# Patient Record
Sex: FEMALE | Race: WHITE | NOT HISPANIC OR LATINO | Employment: UNEMPLOYED | URBAN - NONMETROPOLITAN AREA
[De-identification: names, ages, dates, MRNs, and addresses within clinical notes are randomized per-mention and may not be internally consistent; named-entity substitution may affect disease eponyms.]

---

## 2023-03-03 ENCOUNTER — APPOINTMENT (OUTPATIENT)
Dept: GENERAL RADIOLOGY | Facility: HOSPITAL | Age: 68
DRG: 494 | End: 2023-03-03
Payer: MEDICAID

## 2023-03-03 ENCOUNTER — HOSPITAL ENCOUNTER (INPATIENT)
Facility: HOSPITAL | Age: 68
LOS: 3 days | Discharge: HOME | DRG: 494 | End: 2023-03-06
Attending: ORTHOPAEDIC SURGERY | Admitting: STUDENT IN AN ORGANIZED HEALTH CARE EDUCATION/TRAINING PROGRAM
Payer: MEDICAID

## 2023-03-03 ENCOUNTER — ANESTHESIA EVENT (OUTPATIENT)
Dept: PERIOP | Facility: HOSPITAL | Age: 68
DRG: 494 | End: 2023-03-03
Payer: MEDICAID

## 2023-03-03 ENCOUNTER — ANESTHESIA (OUTPATIENT)
Dept: PERIOP | Facility: HOSPITAL | Age: 68
DRG: 494 | End: 2023-03-03
Payer: MEDICAID

## 2023-03-03 DIAGNOSIS — S82.302A CLOSED FRACTURE OF DISTAL END OF LEFT TIBIA, UNSPECIFIED FRACTURE MORPHOLOGY, INITIAL ENCOUNTER: Primary | ICD-10-CM

## 2023-03-03 DIAGNOSIS — S82.839A CLOSED FRACTURE OF DISTAL END OF FIBULA, UNSPECIFIED FRACTURE MORPHOLOGY, INITIAL ENCOUNTER: ICD-10-CM

## 2023-03-03 DIAGNOSIS — Z74.09 IMPAIRED MOBILITY: ICD-10-CM

## 2023-03-03 LAB
ALBUMIN SERPL-MCNC: 4.4 G/DL (ref 3.5–5.2)
ALBUMIN/GLOB SERPL: 1.8 G/DL
ALP SERPL-CCNC: 57 U/L (ref 39–117)
ALT SERPL W P-5'-P-CCNC: 14 U/L (ref 1–33)
ANION GAP SERPL CALCULATED.3IONS-SCNC: 12 MMOL/L (ref 5–15)
ANION GAP SERPL CALCULATED.3IONS-SCNC: 14 MMOL/L (ref 5–15)
APTT PPP: 27.1 SECONDS (ref 24.1–35)
AST SERPL-CCNC: 23 U/L (ref 1–32)
BACTERIA UR QL AUTO: ABNORMAL /HPF
BASOPHILS # BLD AUTO: 0.04 10*3/MM3 (ref 0–0.2)
BASOPHILS NFR BLD AUTO: 0.2 % (ref 0–1.5)
BILIRUB SERPL-MCNC: 0.3 MG/DL (ref 0–1.2)
BILIRUB UR QL STRIP: NEGATIVE
BUN SERPL-MCNC: 11 MG/DL (ref 8–23)
BUN SERPL-MCNC: 9 MG/DL (ref 8–23)
BUN/CREAT SERPL: 11.4 (ref 7–25)
BUN/CREAT SERPL: 14.1 (ref 7–25)
CALCIUM SPEC-SCNC: 9.1 MG/DL (ref 8.6–10.5)
CALCIUM SPEC-SCNC: 9.2 MG/DL (ref 8.6–10.5)
CHLORIDE SERPL-SCNC: 96 MMOL/L (ref 98–107)
CHLORIDE SERPL-SCNC: 97 MMOL/L (ref 98–107)
CLARITY UR: CLEAR
CO2 SERPL-SCNC: 23 MMOL/L (ref 22–29)
CO2 SERPL-SCNC: 25 MMOL/L (ref 22–29)
COLOR UR: YELLOW
CREAT SERPL-MCNC: 0.78 MG/DL (ref 0.57–1)
CREAT SERPL-MCNC: 0.79 MG/DL (ref 0.57–1)
DEPRECATED RDW RBC AUTO: 47.1 FL (ref 37–54)
EGFRCR SERPLBLD CKD-EPI 2021: 83.1 ML/MIN/1.73
EGFRCR SERPLBLD CKD-EPI 2021: 84.4 ML/MIN/1.73
EOSINOPHIL # BLD AUTO: 0.03 10*3/MM3 (ref 0–0.4)
EOSINOPHIL NFR BLD AUTO: 0.2 % (ref 0.3–6.2)
ERYTHROCYTE [DISTWIDTH] IN BLOOD BY AUTOMATED COUNT: 13.4 % (ref 12.3–15.4)
GLOBULIN UR ELPH-MCNC: 2.4 GM/DL
GLUCOSE SERPL-MCNC: 104 MG/DL (ref 65–99)
GLUCOSE SERPL-MCNC: 149 MG/DL (ref 65–99)
GLUCOSE UR STRIP-MCNC: NEGATIVE MG/DL
HCT VFR BLD AUTO: 40.4 % (ref 34–46.6)
HCT VFR BLD AUTO: 41.3 % (ref 34–46.6)
HGB BLD-MCNC: 13.5 G/DL (ref 12–15.9)
HGB BLD-MCNC: 13.6 G/DL (ref 12–15.9)
HGB UR QL STRIP.AUTO: ABNORMAL
HYALINE CASTS UR QL AUTO: ABNORMAL /LPF
IMM GRANULOCYTES # BLD AUTO: 0.12 10*3/MM3 (ref 0–0.05)
IMM GRANULOCYTES NFR BLD AUTO: 0.7 % (ref 0–0.5)
INR PPP: 1 (ref 0.91–1.09)
KETONES UR QL STRIP: NEGATIVE
LEUKOCYTE ESTERASE UR QL STRIP.AUTO: NEGATIVE
LYMPHOCYTES # BLD AUTO: 1.29 10*3/MM3 (ref 0.7–3.1)
LYMPHOCYTES NFR BLD AUTO: 7 % (ref 19.6–45.3)
MCH RBC QN AUTO: 31.2 PG (ref 26.6–33)
MCHC RBC AUTO-ENTMCNC: 32.9 G/DL (ref 31.5–35.7)
MCV RBC AUTO: 94.7 FL (ref 79–97)
MONOCYTES # BLD AUTO: 0.78 10*3/MM3 (ref 0.1–0.9)
MONOCYTES NFR BLD AUTO: 4.2 % (ref 5–12)
NEUTROPHILS NFR BLD AUTO: 16.19 10*3/MM3 (ref 1.7–7)
NEUTROPHILS NFR BLD AUTO: 87.7 % (ref 42.7–76)
NITRITE UR QL STRIP: NEGATIVE
NRBC BLD AUTO-RTO: 0 /100 WBC (ref 0–0.2)
PH UR STRIP.AUTO: 7 [PH] (ref 5–8)
PLATELET # BLD AUTO: 240 10*3/MM3 (ref 140–450)
PMV BLD AUTO: 9.3 FL (ref 6–12)
POTASSIUM SERPL-SCNC: 4.3 MMOL/L (ref 3.5–5.2)
POTASSIUM SERPL-SCNC: 4.3 MMOL/L (ref 3.5–5.2)
PROT SERPL-MCNC: 6.8 G/DL (ref 6–8.5)
PROT UR QL STRIP: NEGATIVE
PROTHROMBIN TIME: 13.3 SECONDS (ref 11.8–14.8)
RBC # BLD AUTO: 4.36 10*6/MM3 (ref 3.77–5.28)
RBC # UR STRIP: ABNORMAL /HPF
REF LAB TEST METHOD: ABNORMAL
SODIUM SERPL-SCNC: 133 MMOL/L (ref 136–145)
SODIUM SERPL-SCNC: 134 MMOL/L (ref 136–145)
SP GR UR STRIP: 1.01 (ref 1–1.03)
SQUAMOUS #/AREA URNS HPF: ABNORMAL /HPF
UROBILINOGEN UR QL STRIP: ABNORMAL
WBC # UR STRIP: ABNORMAL /HPF
WBC NRBC COR # BLD: 18.45 10*3/MM3 (ref 3.4–10.8)

## 2023-03-03 PROCEDURE — 25010000002 CEFAZOLIN PER 500 MG: Performed by: NURSE ANESTHETIST, CERTIFIED REGISTERED

## 2023-03-03 PROCEDURE — 25010000002 FENTANYL CITRATE (PF) 100 MCG/2ML SOLUTION: Performed by: NURSE ANESTHETIST, CERTIFIED REGISTERED

## 2023-03-03 PROCEDURE — 85018 HEMOGLOBIN: CPT | Performed by: ORTHOPAEDIC SURGERY

## 2023-03-03 PROCEDURE — 73630 X-RAY EXAM OF FOOT: CPT

## 2023-03-03 PROCEDURE — 85730 THROMBOPLASTIN TIME PARTIAL: CPT | Performed by: PHYSICIAN ASSISTANT

## 2023-03-03 PROCEDURE — 85025 COMPLETE CBC W/AUTO DIFF WBC: CPT | Performed by: PHYSICIAN ASSISTANT

## 2023-03-03 PROCEDURE — 25010000002 FENTANYL CITRATE (PF) 50 MCG/ML SOLUTION: Performed by: FAMILY MEDICINE

## 2023-03-03 PROCEDURE — 0 HYDROMORPHONE 1 MG/ML SOLUTION: Performed by: FAMILY MEDICINE

## 2023-03-03 PROCEDURE — 25010000002 PROPOFOL 10 MG/ML EMULSION: Performed by: NURSE ANESTHETIST, CERTIFIED REGISTERED

## 2023-03-03 PROCEDURE — 81001 URINALYSIS AUTO W/SCOPE: CPT | Performed by: STUDENT IN AN ORGANIZED HEALTH CARE EDUCATION/TRAINING PROGRAM

## 2023-03-03 PROCEDURE — 85014 HEMATOCRIT: CPT | Performed by: ORTHOPAEDIC SURGERY

## 2023-03-03 PROCEDURE — 25010000002 ONDANSETRON PER 1 MG: Performed by: NURSE ANESTHETIST, CERTIFIED REGISTERED

## 2023-03-03 PROCEDURE — 73620 X-RAY EXAM OF FOOT: CPT

## 2023-03-03 PROCEDURE — 73610 X-RAY EXAM OF ANKLE: CPT

## 2023-03-03 PROCEDURE — 25010000002 HYDROMORPHONE PER 4 MG: Performed by: EMERGENCY MEDICINE

## 2023-03-03 PROCEDURE — C1713 ANCHOR/SCREW BN/BN,TIS/BN: HCPCS | Performed by: ORTHOPAEDIC SURGERY

## 2023-03-03 PROCEDURE — 76000 FLUOROSCOPY <1 HR PHYS/QHP: CPT

## 2023-03-03 PROCEDURE — 73562 X-RAY EXAM OF KNEE 3: CPT

## 2023-03-03 PROCEDURE — 85610 PROTHROMBIN TIME: CPT | Performed by: PHYSICIAN ASSISTANT

## 2023-03-03 PROCEDURE — 99284 EMERGENCY DEPT VISIT MOD MDM: CPT

## 2023-03-03 PROCEDURE — 0QSH35Z REPOSITION LEFT TIBIA WITH EXTERNAL FIXATION DEVICE, PERCUTANEOUS APPROACH: ICD-10-PCS | Performed by: ORTHOPAEDIC SURGERY

## 2023-03-03 PROCEDURE — 73560 X-RAY EXAM OF KNEE 1 OR 2: CPT

## 2023-03-03 PROCEDURE — 80053 COMPREHEN METABOLIC PANEL: CPT | Performed by: PHYSICIAN ASSISTANT

## 2023-03-03 PROCEDURE — 25010000002 HYDROMORPHONE PER 4 MG: Performed by: ANESTHESIOLOGY

## 2023-03-03 PROCEDURE — 73590 X-RAY EXAM OF LOWER LEG: CPT

## 2023-03-03 PROCEDURE — 71045 X-RAY EXAM CHEST 1 VIEW: CPT

## 2023-03-03 DEVICE — PIN TRNSFX 6.0X225MM: Type: IMPLANTABLE DEVICE | Site: ANKLE | Status: FUNCTIONAL

## 2023-03-03 DEVICE — SCRW SCHNZ SD PT 5X200MM: Type: IMPLANTABLE DEVICE | Site: ANKLE | Status: FUNCTIONAL

## 2023-03-03 DEVICE — IMPLANTABLE DEVICE: Type: IMPLANTABLE DEVICE | Site: ANKLE | Status: FUNCTIONAL

## 2023-03-03 RX ORDER — TRAZODONE HYDROCHLORIDE 50 MG/1
25 TABLET ORAL NIGHTLY PRN
Status: DISCONTINUED | OUTPATIENT
Start: 2023-03-03 | End: 2023-03-06 | Stop reason: HOSPADM

## 2023-03-03 RX ORDER — FENTANYL CITRATE 50 UG/ML
25 INJECTION, SOLUTION INTRAMUSCULAR; INTRAVENOUS ONCE
Status: COMPLETED | OUTPATIENT
Start: 2023-03-03 | End: 2023-03-03

## 2023-03-03 RX ORDER — OXYCODONE HYDROCHLORIDE AND ACETAMINOPHEN 5; 325 MG/1; MG/1
1 TABLET ORAL EVERY 4 HOURS PRN
Status: DISCONTINUED | OUTPATIENT
Start: 2023-03-03 | End: 2023-03-06 | Stop reason: HOSPADM

## 2023-03-03 RX ORDER — ACETAMINOPHEN 325 MG/1
650 TABLET ORAL EVERY 4 HOURS PRN
Status: DISCONTINUED | OUTPATIENT
Start: 2023-03-03 | End: 2023-03-06 | Stop reason: HOSPADM

## 2023-03-03 RX ORDER — SODIUM CHLORIDE 0.9 % (FLUSH) 0.9 %
3-10 SYRINGE (ML) INJECTION AS NEEDED
Status: DISCONTINUED | OUTPATIENT
Start: 2023-03-03 | End: 2023-03-06 | Stop reason: HOSPADM

## 2023-03-03 RX ORDER — ASPIRIN 325 MG
325 TABLET ORAL DAILY
Status: DISCONTINUED | OUTPATIENT
Start: 2023-03-04 | End: 2023-03-06 | Stop reason: HOSPADM

## 2023-03-03 RX ORDER — SODIUM CHLORIDE, SODIUM LACTATE, POTASSIUM CHLORIDE, CALCIUM CHLORIDE 600; 310; 30; 20 MG/100ML; MG/100ML; MG/100ML; MG/100ML
100 INJECTION, SOLUTION INTRAVENOUS CONTINUOUS
Status: DISCONTINUED | OUTPATIENT
Start: 2023-03-03 | End: 2023-03-06 | Stop reason: HOSPADM

## 2023-03-03 RX ORDER — PROPOFOL 10 MG/ML
VIAL (ML) INTRAVENOUS AS NEEDED
Status: DISCONTINUED | OUTPATIENT
Start: 2023-03-03 | End: 2023-03-03 | Stop reason: SURG

## 2023-03-03 RX ORDER — SUCCINYLCHOLINE/SOD CL,ISO/PF 200MG/10ML
SYRINGE (ML) INTRAVENOUS AS NEEDED
Status: DISCONTINUED | OUTPATIENT
Start: 2023-03-03 | End: 2023-03-03 | Stop reason: SURG

## 2023-03-03 RX ORDER — FLUMAZENIL 0.1 MG/ML
0.2 INJECTION INTRAVENOUS AS NEEDED
Status: DISCONTINUED | OUTPATIENT
Start: 2023-03-03 | End: 2023-03-03 | Stop reason: HOSPADM

## 2023-03-03 RX ORDER — OXYCODONE HYDROCHLORIDE 5 MG/1
5 TABLET ORAL EVERY 4 HOURS PRN
Status: DISCONTINUED | OUTPATIENT
Start: 2023-03-03 | End: 2023-03-06 | Stop reason: HOSPADM

## 2023-03-03 RX ORDER — FAMOTIDINE 20 MG/1
20 TABLET, FILM COATED ORAL 2 TIMES DAILY PRN
Status: DISCONTINUED | OUTPATIENT
Start: 2023-03-03 | End: 2023-03-06 | Stop reason: HOSPADM

## 2023-03-03 RX ORDER — CEFAZOLIN SODIUM 1 G/3ML
INJECTION, POWDER, FOR SOLUTION INTRAMUSCULAR; INTRAVENOUS AS NEEDED
Status: DISCONTINUED | OUTPATIENT
Start: 2023-03-03 | End: 2023-03-03 | Stop reason: SURG

## 2023-03-03 RX ORDER — LIDOCAINE HYDROCHLORIDE 20 MG/ML
INJECTION, SOLUTION EPIDURAL; INFILTRATION; INTRACAUDAL; PERINEURAL AS NEEDED
Status: DISCONTINUED | OUTPATIENT
Start: 2023-03-03 | End: 2023-03-03 | Stop reason: SURG

## 2023-03-03 RX ORDER — ONDANSETRON 2 MG/ML
4 INJECTION INTRAMUSCULAR; INTRAVENOUS EVERY 6 HOURS PRN
Status: DISCONTINUED | OUTPATIENT
Start: 2023-03-03 | End: 2023-03-06 | Stop reason: HOSPADM

## 2023-03-03 RX ORDER — SODIUM CHLORIDE 0.9 % (FLUSH) 0.9 %
10 SYRINGE (ML) INJECTION AS NEEDED
Status: DISCONTINUED | OUTPATIENT
Start: 2023-03-03 | End: 2023-03-06 | Stop reason: HOSPADM

## 2023-03-03 RX ORDER — ONDANSETRON 4 MG/1
4 TABLET, FILM COATED ORAL EVERY 6 HOURS PRN
Status: DISCONTINUED | OUTPATIENT
Start: 2023-03-03 | End: 2023-03-06 | Stop reason: HOSPADM

## 2023-03-03 RX ORDER — SODIUM CHLORIDE, SODIUM LACTATE, POTASSIUM CHLORIDE, CALCIUM CHLORIDE 600; 310; 30; 20 MG/100ML; MG/100ML; MG/100ML; MG/100ML
125 INJECTION, SOLUTION INTRAVENOUS CONTINUOUS
Status: DISCONTINUED | OUTPATIENT
Start: 2023-03-03 | End: 2023-03-06 | Stop reason: HOSPADM

## 2023-03-03 RX ORDER — SODIUM CHLORIDE 0.9 % (FLUSH) 0.9 %
10 SYRINGE (ML) INJECTION EVERY 12 HOURS SCHEDULED
Status: DISCONTINUED | OUTPATIENT
Start: 2023-03-03 | End: 2023-03-06 | Stop reason: HOSPADM

## 2023-03-03 RX ORDER — SODIUM CHLORIDE 0.9 % (FLUSH) 0.9 %
3 SYRINGE (ML) INJECTION EVERY 12 HOURS SCHEDULED
Status: DISCONTINUED | OUTPATIENT
Start: 2023-03-03 | End: 2023-03-06 | Stop reason: HOSPADM

## 2023-03-03 RX ORDER — NICOTINE 21 MG/24HR
1 PATCH, TRANSDERMAL 24 HOURS TRANSDERMAL EVERY 24 HOURS
Status: DISCONTINUED | OUTPATIENT
Start: 2023-03-03 | End: 2023-03-06 | Stop reason: HOSPADM

## 2023-03-03 RX ORDER — FENTANYL CITRATE 50 UG/ML
INJECTION, SOLUTION INTRAMUSCULAR; INTRAVENOUS AS NEEDED
Status: DISCONTINUED | OUTPATIENT
Start: 2023-03-03 | End: 2023-03-03 | Stop reason: SURG

## 2023-03-03 RX ORDER — HYDROMORPHONE HYDROCHLORIDE 1 MG/ML
0.5 INJECTION, SOLUTION INTRAMUSCULAR; INTRAVENOUS; SUBCUTANEOUS ONCE
Status: COMPLETED | OUTPATIENT
Start: 2023-03-03 | End: 2023-03-03

## 2023-03-03 RX ORDER — ROCURONIUM BROMIDE 10 MG/ML
INJECTION, SOLUTION INTRAVENOUS AS NEEDED
Status: DISCONTINUED | OUTPATIENT
Start: 2023-03-03 | End: 2023-03-03 | Stop reason: SURG

## 2023-03-03 RX ORDER — ONDANSETRON 2 MG/ML
INJECTION INTRAMUSCULAR; INTRAVENOUS AS NEEDED
Status: DISCONTINUED | OUTPATIENT
Start: 2023-03-03 | End: 2023-03-03 | Stop reason: SURG

## 2023-03-03 RX ORDER — FENTANYL CITRATE 50 UG/ML
25 INJECTION, SOLUTION INTRAMUSCULAR; INTRAVENOUS
Status: DISCONTINUED | OUTPATIENT
Start: 2023-03-03 | End: 2023-03-03 | Stop reason: HOSPADM

## 2023-03-03 RX ORDER — SODIUM CHLORIDE 9 MG/ML
40 INJECTION, SOLUTION INTRAVENOUS AS NEEDED
Status: DISCONTINUED | OUTPATIENT
Start: 2023-03-03 | End: 2023-03-06 | Stop reason: HOSPADM

## 2023-03-03 RX ORDER — NALOXONE HCL 0.4 MG/ML
0.4 VIAL (ML) INJECTION
Status: DISCONTINUED | OUTPATIENT
Start: 2023-03-03 | End: 2023-03-06 | Stop reason: HOSPADM

## 2023-03-03 RX ORDER — LABETALOL HYDROCHLORIDE 5 MG/ML
5 INJECTION, SOLUTION INTRAVENOUS
Status: DISCONTINUED | OUTPATIENT
Start: 2023-03-03 | End: 2023-03-03 | Stop reason: HOSPADM

## 2023-03-03 RX ORDER — BUPIVACAINE HCL/0.9 % NACL/PF 0.1 %
2 PLASTIC BAG, INJECTION (ML) EPIDURAL EVERY 8 HOURS
Status: COMPLETED | OUTPATIENT
Start: 2023-03-04 | End: 2023-03-04

## 2023-03-03 RX ORDER — PROMETHAZINE HYDROCHLORIDE 25 MG/1
12.5 TABLET ORAL EVERY 6 HOURS PRN
Status: DISCONTINUED | OUTPATIENT
Start: 2023-03-03 | End: 2023-03-06 | Stop reason: HOSPADM

## 2023-03-03 RX ORDER — OXYCODONE AND ACETAMINOPHEN 10; 325 MG/1; MG/1
1 TABLET ORAL ONCE AS NEEDED
Status: COMPLETED | OUTPATIENT
Start: 2023-03-03 | End: 2023-03-03

## 2023-03-03 RX ORDER — HYDROMORPHONE HYDROCHLORIDE 1 MG/ML
0.5 INJECTION, SOLUTION INTRAMUSCULAR; INTRAVENOUS; SUBCUTANEOUS
Status: DISCONTINUED | OUTPATIENT
Start: 2023-03-03 | End: 2023-03-06 | Stop reason: RX

## 2023-03-03 RX ORDER — LIDOCAINE HYDROCHLORIDE 10 MG/ML
0.5 INJECTION, SOLUTION EPIDURAL; INFILTRATION; INTRACAUDAL; PERINEURAL ONCE AS NEEDED
Status: DISCONTINUED | OUTPATIENT
Start: 2023-03-03 | End: 2023-03-06 | Stop reason: HOSPADM

## 2023-03-03 RX ORDER — HYDROMORPHONE HYDROCHLORIDE 1 MG/ML
0.5 INJECTION, SOLUTION INTRAMUSCULAR; INTRAVENOUS; SUBCUTANEOUS
Status: DISCONTINUED | OUTPATIENT
Start: 2023-03-03 | End: 2023-03-03 | Stop reason: HOSPADM

## 2023-03-03 RX ORDER — CHOLECALCIFEROL (VITAMIN D3) 125 MCG
5 CAPSULE ORAL NIGHTLY PRN
Status: DISCONTINUED | OUTPATIENT
Start: 2023-03-03 | End: 2023-03-06 | Stop reason: HOSPADM

## 2023-03-03 RX ORDER — CALCIUM CARBONATE 200(500)MG
2 TABLET,CHEWABLE ORAL 3 TIMES DAILY PRN
Status: DISCONTINUED | OUTPATIENT
Start: 2023-03-03 | End: 2023-03-06 | Stop reason: HOSPADM

## 2023-03-03 RX ORDER — ONDANSETRON 2 MG/ML
4 INJECTION INTRAMUSCULAR; INTRAVENOUS
Status: DISCONTINUED | OUTPATIENT
Start: 2023-03-03 | End: 2023-03-03 | Stop reason: HOSPADM

## 2023-03-03 RX ORDER — MAGNESIUM HYDROXIDE 1200 MG/15ML
LIQUID ORAL AS NEEDED
Status: DISCONTINUED | OUTPATIENT
Start: 2023-03-03 | End: 2023-03-03 | Stop reason: HOSPADM

## 2023-03-03 RX ORDER — SODIUM CHLORIDE, SODIUM LACTATE, POTASSIUM CHLORIDE, CALCIUM CHLORIDE 600; 310; 30; 20 MG/100ML; MG/100ML; MG/100ML; MG/100ML
INJECTION, SOLUTION INTRAVENOUS CONTINUOUS PRN
Status: DISCONTINUED | OUTPATIENT
Start: 2023-03-03 | End: 2023-03-03 | Stop reason: SURG

## 2023-03-03 RX ORDER — NALOXONE HCL 0.4 MG/ML
0.04 VIAL (ML) INJECTION AS NEEDED
Status: DISCONTINUED | OUTPATIENT
Start: 2023-03-03 | End: 2023-03-03 | Stop reason: HOSPADM

## 2023-03-03 RX ORDER — MELOXICAM 7.5 MG/1
15 TABLET ORAL DAILY
Status: DISCONTINUED | OUTPATIENT
Start: 2023-03-04 | End: 2023-03-06 | Stop reason: HOSPADM

## 2023-03-03 RX ORDER — DROPERIDOL 2.5 MG/ML
0.62 INJECTION, SOLUTION INTRAMUSCULAR; INTRAVENOUS ONCE AS NEEDED
Status: DISCONTINUED | OUTPATIENT
Start: 2023-03-03 | End: 2023-03-03 | Stop reason: HOSPADM

## 2023-03-03 RX ADMIN — FENTANYL CITRATE 25 MCG: 50 INJECTION INTRAMUSCULAR; INTRAVENOUS at 14:47

## 2023-03-03 RX ADMIN — CEFAZOLIN 2 G: 330 INJECTION, POWDER, FOR SOLUTION INTRAMUSCULAR; INTRAVENOUS at 19:55

## 2023-03-03 RX ADMIN — SODIUM CHLORIDE, POTASSIUM CHLORIDE, SODIUM LACTATE AND CALCIUM CHLORIDE 100 ML/HR: 600; 310; 30; 20 INJECTION, SOLUTION INTRAVENOUS at 22:01

## 2023-03-03 RX ADMIN — OXYCODONE HYDROCHLORIDE 5 MG: 5 TABLET ORAL at 21:55

## 2023-03-03 RX ADMIN — HYDROMORPHONE HYDROCHLORIDE 0.5 MG: 1 INJECTION, SOLUTION INTRAMUSCULAR; INTRAVENOUS; SUBCUTANEOUS at 18:20

## 2023-03-03 RX ADMIN — HYDROMORPHONE HYDROCHLORIDE 0.5 MG: 1 INJECTION, SOLUTION INTRAMUSCULAR; INTRAVENOUS; SUBCUTANEOUS at 16:21

## 2023-03-03 RX ADMIN — Medication 3 ML: at 23:04

## 2023-03-03 RX ADMIN — Medication 120 MG: at 19:40

## 2023-03-03 RX ADMIN — Medication 10 ML: at 23:05

## 2023-03-03 RX ADMIN — FENTANYL CITRATE 50 MCG: 50 INJECTION INTRAMUSCULAR; INTRAVENOUS at 20:24

## 2023-03-03 RX ADMIN — PROPOFOL INJECTABLE EMULSION 200 MG: 10 INJECTION, EMULSION INTRAVENOUS at 19:40

## 2023-03-03 RX ADMIN — LIDOCAINE HYDROCHLORIDE 100 MG: 20 INJECTION, SOLUTION EPIDURAL; INFILTRATION; INTRACAUDAL; PERINEURAL at 19:40

## 2023-03-03 RX ADMIN — ONDANSETRON 4 MG: 2 INJECTION INTRAMUSCULAR; INTRAVENOUS at 20:46

## 2023-03-03 RX ADMIN — SODIUM CHLORIDE, POTASSIUM CHLORIDE, SODIUM LACTATE AND CALCIUM CHLORIDE: 600; 310; 30; 20 INJECTION, SOLUTION INTRAVENOUS at 19:34

## 2023-03-03 RX ADMIN — NICOTINE 1 PATCH: 21 PATCH, EXTENDED RELEASE TRANSDERMAL at 23:04

## 2023-03-03 RX ADMIN — OXYCODONE AND ACETAMINOPHEN 1 TABLET: 325; 10 TABLET ORAL at 21:16

## 2023-03-03 RX ADMIN — FENTANYL CITRATE 50 MCG: 50 INJECTION INTRAMUSCULAR; INTRAVENOUS at 20:05

## 2023-03-03 RX ADMIN — FENTANYL CITRATE 100 MCG: 50 INJECTION INTRAMUSCULAR; INTRAVENOUS at 19:40

## 2023-03-03 RX ADMIN — HYDROMORPHONE HYDROCHLORIDE 0.5 MG: 1 INJECTION, SOLUTION INTRAMUSCULAR; INTRAVENOUS; SUBCUTANEOUS at 21:00

## 2023-03-03 RX ADMIN — ROCURONIUM BROMIDE 10 MG: 10 INJECTION, SOLUTION INTRAVENOUS at 19:40
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
## 2023-03-04 ENCOUNTER — APPOINTMENT (OUTPATIENT)
Dept: CT IMAGING | Facility: HOSPITAL | Age: 68
DRG: 494 | End: 2023-03-04
Payer: MEDICAID

## 2023-03-04 LAB
ANION GAP SERPL CALCULATED.3IONS-SCNC: 14 MMOL/L (ref 5–15)
BASOPHILS # BLD AUTO: 0.01 10*3/MM3 (ref 0–0.2)
BASOPHILS NFR BLD AUTO: 0.1 % (ref 0–1.5)
BUN SERPL-MCNC: 9 MG/DL (ref 8–23)
BUN/CREAT SERPL: 11.3 (ref 7–25)
CALCIUM SPEC-SCNC: 9.5 MG/DL (ref 8.6–10.5)
CHLORIDE SERPL-SCNC: 95 MMOL/L (ref 98–107)
CO2 SERPL-SCNC: 23 MMOL/L (ref 22–29)
CREAT SERPL-MCNC: 0.8 MG/DL (ref 0.57–1)
DEPRECATED RDW RBC AUTO: 46.6 FL (ref 37–54)
EGFRCR SERPLBLD CKD-EPI 2021: 81.9 ML/MIN/1.73
EOSINOPHIL # BLD AUTO: 0 10*3/MM3 (ref 0–0.4)
EOSINOPHIL NFR BLD AUTO: 0 % (ref 0.3–6.2)
ERYTHROCYTE [DISTWIDTH] IN BLOOD BY AUTOMATED COUNT: 13.2 % (ref 12.3–15.4)
GLUCOSE SERPL-MCNC: 151 MG/DL (ref 65–99)
HCT VFR BLD AUTO: 40.7 % (ref 34–46.6)
HGB BLD-MCNC: 13.6 G/DL (ref 12–15.9)
IMM GRANULOCYTES # BLD AUTO: 0.06 10*3/MM3 (ref 0–0.05)
IMM GRANULOCYTES NFR BLD AUTO: 0.5 % (ref 0–0.5)
LYMPHOCYTES # BLD AUTO: 0.8 10*3/MM3 (ref 0.7–3.1)
LYMPHOCYTES NFR BLD AUTO: 6.6 % (ref 19.6–45.3)
MCH RBC QN AUTO: 31.6 PG (ref 26.6–33)
MCHC RBC AUTO-ENTMCNC: 33.4 G/DL (ref 31.5–35.7)
MCV RBC AUTO: 94.4 FL (ref 79–97)
MONOCYTES # BLD AUTO: 0.5 10*3/MM3 (ref 0.1–0.9)
MONOCYTES NFR BLD AUTO: 4.2 % (ref 5–12)
NEUTROPHILS NFR BLD AUTO: 10.67 10*3/MM3 (ref 1.7–7)
NEUTROPHILS NFR BLD AUTO: 88.6 % (ref 42.7–76)
NRBC BLD AUTO-RTO: 0 /100 WBC (ref 0–0.2)
PLATELET # BLD AUTO: 233 10*3/MM3 (ref 140–450)
PMV BLD AUTO: 9.5 FL (ref 6–12)
POTASSIUM SERPL-SCNC: 4.4 MMOL/L (ref 3.5–5.2)
RBC # BLD AUTO: 4.31 10*6/MM3 (ref 3.77–5.28)
SODIUM SERPL-SCNC: 132 MMOL/L (ref 136–145)
WBC NRBC COR # BLD: 12.04 10*3/MM3 (ref 3.4–10.8)

## 2023-03-04 PROCEDURE — 73700 CT LOWER EXTREMITY W/O DYE: CPT

## 2023-03-04 PROCEDURE — 25010000002 HYDROMORPHONE PER 4 MG: Performed by: STUDENT IN AN ORGANIZED HEALTH CARE EDUCATION/TRAINING PROGRAM

## 2023-03-04 PROCEDURE — 85025 COMPLETE CBC W/AUTO DIFF WBC: CPT | Performed by: STUDENT IN AN ORGANIZED HEALTH CARE EDUCATION/TRAINING PROGRAM

## 2023-03-04 PROCEDURE — 25010000002 CEFAZOLIN PER 500 MG: Performed by: ORTHOPAEDIC SURGERY

## 2023-03-04 PROCEDURE — 80048 BASIC METABOLIC PNL TOTAL CA: CPT | Performed by: ORTHOPAEDIC SURGERY

## 2023-03-04 RX ADMIN — Medication 10 ML: at 08:30

## 2023-03-04 RX ADMIN — MELOXICAM 15 MG: 7.5 TABLET ORAL at 08:30

## 2023-03-04 RX ADMIN — CEFAZOLIN 2 G: 2 INJECTION, POWDER, FOR SOLUTION INTRAMUSCULAR; INTRAVENOUS at 04:19

## 2023-03-04 RX ADMIN — HYDROMORPHONE HYDROCHLORIDE 0.5 MG: 1 INJECTION, SOLUTION INTRAMUSCULAR; INTRAVENOUS; SUBCUTANEOUS at 13:25

## 2023-03-04 RX ADMIN — ASPIRIN 325 MG: 325 TABLET ORAL at 08:30

## 2023-03-04 RX ADMIN — Medication 10 ML: at 21:05

## 2023-03-04 RX ADMIN — NICOTINE 1 PATCH: 21 PATCH, EXTENDED RELEASE TRANSDERMAL at 21:05

## 2023-03-04 RX ADMIN — HYDROMORPHONE HYDROCHLORIDE 0.5 MG: 1 INJECTION, SOLUTION INTRAMUSCULAR; INTRAVENOUS; SUBCUTANEOUS at 07:40

## 2023-03-04 RX ADMIN — OXYCODONE HYDROCHLORIDE AND ACETAMINOPHEN 1 TABLET: 5; 325 TABLET ORAL at 18:51

## 2023-03-04 RX ADMIN — CEFAZOLIN 2 G: 2 INJECTION, POWDER, FOR SOLUTION INTRAMUSCULAR; INTRAVENOUS at 11:59

## 2023-03-04 RX ADMIN — HYDROMORPHONE HYDROCHLORIDE 0.5 MG: 1 INJECTION, SOLUTION INTRAMUSCULAR; INTRAVENOUS; SUBCUTANEOUS at 03:18

## 2023-03-04 RX ADMIN — OXYCODONE HYDROCHLORIDE 5 MG: 5 TABLET ORAL at 06:00

## 2023-03-04 NOTE — PROGRESS NOTES
"Subjective:     Post-Operative Day: 1 No complaints other than left ankle surgery pain.     Objective:     Patient Vitals for the past 24 hrs:   BP Temp Temp src Pulse Resp SpO2 Height Weight   03/04/23 0716 176/77 98 °F (36.7 °C) Oral 90 18 93 % -- --   03/04/23 0353 123/72 98.4 °F (36.9 °C) Oral 71 18 94 % -- --   03/03/23 2308 150/65 98.5 °F (36.9 °C) Oral 78 18 93 % -- --   03/03/23 2225 159/67 98.2 °F (36.8 °C) Oral 81 18 94 % -- --   03/03/23 2155 106/82 98.4 °F (36.9 °C) Oral 91 16 92 % -- --   03/03/23 2120 164/87 98.8 °F (37.1 °C) Temporal 95 14 98 % -- --   03/03/23 2110 165/73 -- -- 94 14 96 % -- --   03/03/23 2100 147/69 -- -- 72 14 94 % -- --   03/03/23 2055 161/67 -- -- 92 14 99 % -- --   03/03/23 2050 153/75 -- -- 91 12 100 % -- --   03/03/23 2045 147/89 -- -- 96 12 100 % -- --   03/03/23 2042 147/69 97.7 °F (36.5 °C) Temporal 96 12 100 % -- --   03/03/23 1559 139/71 -- -- 65 17 96 % -- --   03/03/23 1417 133/69 98.5 °F (36.9 °C) Oral 90 13 98 % 165.1 cm (65\") 61.2 kg (135 lb)       General: alert, appears stated age and cooperative   Wound: External fixator on left ankle in good position. Moderate swelling and bruising to left ankle   Neurovascular: Exam normal   DVT Exam: No evidence of DVT seen on physical exam.         Data Review:  Results from last 7 days   Lab Units 03/04/23  0617 03/03/23  2150   HEMOGLOBIN g/dL 13.6 13.5     Results from last 7 days   Lab Units 03/04/23  0617   SODIUM mmol/L 132*   POTASSIUM mmol/L 4.4   CREATININE mg/dL 0.80          Assessment:     Status Post external fixator to left ankle for closed, comminuted distal tibia and fibula. Doing well postoperatively. . Acute postoperative anemia    Plan:   Ice and elevation of left lower extremity  CT left ankle  Plan for delayed ORIF to her fractures in approximately 10 to 14 days  Pain control  PT/OT- Nonweightbearing left ankle  DVT prophylaxis  Ice and elevate  Discharge home in a few days    "
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
## 2023-03-04 NOTE — ANESTHESIA PREPROCEDURE EVALUATION
Anesthesia Evaluation     Patient summary reviewed   no history of anesthetic complications:  NPO Solid Status: > 8 hours  NPO Liquid Status: > 8 hours           Airway   Mallampati: I  TM distance: >3 FB  Neck ROM: full  Dental    (+) edentulous    Pulmonary    (+) a smoker Current,   Cardiovascular - negative cardio ROS        Neuro/Psych- negative ROS  GI/Hepatic/Renal/Endo - negative ROS     Musculoskeletal (-) negative ROS    Abdominal    Substance History      OB/GYN          Other                        Anesthesia Plan    ASA 2 - emergent     general     intravenous induction     Anesthetic plan, risks, benefits, and alternatives have been provided, discussed and informed consent has been obtained with: patient.        CODE STATUS:
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
## 2023-03-04 NOTE — PLAN OF CARE
Goal Outcome Evaluation:  Plan of Care Reviewed With: patient, family           Outcome Evaluation: From PACU to rom 355 with external fixator and elastic traction to rt ankle. Elevated with ice.Numbness in feet baseline.  A&Ox4. PO and IV push pain meds with some results. Voiding bedpan. IVF and abx as ordered. RA/. Sleeping some. Plan is to have ORIF in a few weeks.

## 2023-03-04 NOTE — OP NOTE
ANKLE EXTERNAL FIXATOR APPLICATION  Procedure Report    Patient Name:  Nicci Johns  YOB: 1955    Date of Surgery:  3/3/2023         Pre-op diagnosis: Comminuted distal tibia and fibula fracture left ankle  Postop diagnosis: Same    Procedure: Left ankle external fixator    OR team:  Tobi Cortes CST  Surgeon(s):  LAYNE Vallejo MD  Assistant: Tobi Cortes CSFA  Anesthesia:   General  Tourniquet time: None  Estimated blood loss: none    Complication: None    Findings: None    Specimen:          None    Implants:    Implant Name Type Inv. Item Serial No.  Lot No. LRB No. Used Action   DRAEN ATTACH FIX/EXT/LG MR/CONFIG FOR MULTIPIN/CLMP - [id number] Implant DAREN ATTACH FIX/EXT/LG MR/CONFIG FOR MULTIPIN/CLMP  DEPUY SYNTHES  Left 2 Implanted   PIN TRNSFX [id number] - [id number] Implant PIN TRNSFX [id number]  DEPUY SYNTHES  Left 1 Implanted   SCRW SCHNZ SD PT [id number] - [id number] Implant SCRW SCHNZ SD PT [id number]  DEPUY SYNTHES  Left 2 Implanted         Procedure in detail: Patient was brought into the operating room.  General endotracheal anesthetic was placed.  The extremity was sterilely prepped and draped with chlorhexidine alcohol and standard draping technique.  A small incision was made about a handbreadth above the proximal extent of her tibial fracture just on the along the medial side of the crest of the tibia.  A drill was used to create a hole for the first Steinmann pin.  The first Steinmann pin was then placed in bicortical fashion.  A second was placed peril lateral to it and more proximal.  All stab incision was used there as well.  The appropriate clamp was tightened to the pins.  A calcaneal pin was inserted medial to lateral through a small stab incision.  Pin to bar clamps were then used to place a A-frame Ex-Fix on the ankle.  Traction was applied.  The fracture reduced pretty nicely.  All the pin to bar clamps were tightened.  Sterile dressings were applied.  An elastic  strap was placed under the metatarsal heads and attached to the proximal aspect of the frame to hold the ankle out of plantarflexion.  Plan will be for ice elevation and delayed ORIF of her fractures in approximately 10 to 14 days.  Will be nonweightbearing.  She was awakened extubated and transferred to the cover room in stable condition.     ALDAIR Vallejo MD    Date: 3/3/2023  Time: 20:23 CST

## 2023-03-04 NOTE — ANESTHESIA POSTPROCEDURE EVALUATION
Patient: Nicci Johns    Procedure Summary     Date: 03/03/23 Room / Location:  [facility] OR 09 /  [facility]    Anesthesia Start: 1935 Anesthesia Stop: 2047    Procedure: ANKLE EXTERNAL FIXATOR APPLICATION (Left: Ankle) Diagnosis:     Surgeons: LAYNE Vallejo MD Provider: Ruben De Jesus CRNA    Anesthesia Type: general ASA Status: 2 - Emergent          Anesthesia Type: general    Vitals  Vitals Value Taken Time   /87 03/03/23 2120   Temp 98.8 °F (37.1 °C) 03/03/23 2120   Pulse 98 03/03/23 2121   Resp 14 03/03/23 2120   SpO2 98 % 03/03/23 2121   Vitals shown include unvalidated device data.        Post Anesthesia Care and Evaluation      Comments: Discharged per PACU Criteria
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
## 2023-03-04 NOTE — ANESTHESIA PROCEDURE NOTES
Airway  Date/Time: 3/3/2023 7:41 PM  Airway not difficult    General Information and Staff    Patient location during procedure: OR  CRNA/CAA: Ruben DeJ esus CRNA    Indications and Patient Condition  Indications for airway management: airway protection    Preoxygenated: yes  Mask difficulty assessment: 1 - vent by mask    Final Airway Details  Final airway type: endotracheal airway      Successful airway: ETT  Cuffed: yes   Successful intubation technique: direct laryngoscopy  Facilitating devices/methods: intubating stylet  Endotracheal tube insertion site: oral  Blade: Rob  Blade size: 2  ETT size (mm): 7.0  Cormack-Lehane Classification: grade IIa - partial view of glottis  Placement verified by: capnometry   Cuff volume (mL): 7  Measured from: lips  ETT/EBT  to lips (cm): 22  Number of attempts at approach: 1  Assessment: lips, teeth, and gum same as pre-op and atraumatic intubation

## 2023-03-05 LAB
ANION GAP SERPL CALCULATED.3IONS-SCNC: 10 MMOL/L (ref 5–15)
BUN SERPL-MCNC: 11 MG/DL (ref 8–23)
BUN/CREAT SERPL: 15.3 (ref 7–25)
CALCIUM SPEC-SCNC: 9.4 MG/DL (ref 8.6–10.5)
CHLORIDE SERPL-SCNC: 99 MMOL/L (ref 98–107)
CO2 SERPL-SCNC: 28 MMOL/L (ref 22–29)
CREAT SERPL-MCNC: 0.72 MG/DL (ref 0.57–1)
EGFRCR SERPLBLD CKD-EPI 2021: 92.9 ML/MIN/1.73
GLUCOSE SERPL-MCNC: 107 MG/DL (ref 65–99)
HBA1C MFR BLD: 5.6 % (ref 4.8–5.6)
HCT VFR BLD AUTO: 38.1 % (ref 34–46.6)
HGB BLD-MCNC: 12.9 G/DL (ref 12–15.9)
POTASSIUM SERPL-SCNC: 3.8 MMOL/L (ref 3.5–5.2)
SODIUM SERPL-SCNC: 137 MMOL/L (ref 136–145)

## 2023-03-05 PROCEDURE — 83036 HEMOGLOBIN GLYCOSYLATED A1C: CPT | Performed by: FAMILY MEDICINE

## 2023-03-05 PROCEDURE — 80048 BASIC METABOLIC PNL TOTAL CA: CPT | Performed by: ORTHOPAEDIC SURGERY

## 2023-03-05 PROCEDURE — 85014 HEMATOCRIT: CPT | Performed by: ORTHOPAEDIC SURGERY

## 2023-03-05 PROCEDURE — 85018 HEMOGLOBIN: CPT | Performed by: ORTHOPAEDIC SURGERY

## 2023-03-05 PROCEDURE — 97161 PT EVAL LOW COMPLEX 20 MIN: CPT

## 2023-03-05 RX ADMIN — ASPIRIN 325 MG: 325 TABLET ORAL at 08:30

## 2023-03-05 RX ADMIN — NICOTINE 1 PATCH: 21 PATCH, EXTENDED RELEASE TRANSDERMAL at 21:23

## 2023-03-05 RX ADMIN — OXYCODONE HYDROCHLORIDE AND ACETAMINOPHEN 1 TABLET: 5; 325 TABLET ORAL at 00:08

## 2023-03-05 RX ADMIN — OXYCODONE HYDROCHLORIDE AND ACETAMINOPHEN 1 TABLET: 5; 325 TABLET ORAL at 14:23

## 2023-03-05 RX ADMIN — Medication 10 ML: at 21:23

## 2023-03-05 RX ADMIN — Medication 3 ML: at 21:23

## 2023-03-05 RX ADMIN — MELOXICAM 15 MG: 7.5 TABLET ORAL at 08:30

## 2023-03-05 NOTE — PLAN OF CARE
Goal Outcome Evaluation:  Plan of Care Reviewed With: patient        Progress: improving  Outcome Evaluation: A&Ox4.  External fixator with elastic traction to LLE.  Pain controlled with PO prn pain med.  Pain rated at 5.  Voiding bedpan. RA. SCD. Tele continued.  Sleeping well.

## 2023-03-05 NOTE — PROGRESS NOTES
Subjective:     Post-Operative Day: 2 No complaints     Objective:     Patient Vitals for the past 24 hrs:   BP Temp Temp src Pulse Resp SpO2   03/04/23 2357 137/62 98.7 °F (37.1 °C) Oral 77 16 96 %   03/04/23 1953 155/67 98.8 °F (37.1 °C) Oral 74 16 94 %   03/04/23 1555 132/68 98.4 °F (36.9 °C) Oral 71 18 97 %   03/04/23 1206 134/54 98.4 °F (36.9 °C) Oral 64 16 94 %       General: alert, appears stated age and cooperative   Wound: External fixator on left ankle in good position. Moderate swelling and bruising to left ankle   Neurovascular: Exam normal   DVT Exam: No evidence of DVT seen on physical exam.         Data Review:  Results from last 7 days   Lab Units 03/05/23  0353 03/04/23  0617   HEMOGLOBIN g/dL 12.9 13.6     Results from last 7 days   Lab Units 03/05/23  0353   SODIUM mmol/L 137   POTASSIUM mmol/L 3.8   CREATININE mg/dL 0.72          Assessment:     Status Post external fixator to left ankle for closed, comminuted distal tibia and fibula. Doing well postoperatively.     Plan:   Ice and elevation of left lower extremity  Plan for delayed ORIF to her fractures in approximately 10 to 14 days  Pain control  PT/OT- Nonweightbearing left ankle  DVT prophylaxis  Ice and elevate  Okay from Orthopaedic standpoint to discharge home today if patient does well with PT.  Plan to see this patient back in my office in 1 week and repeat X rays at that time.

## 2023-03-05 NOTE — DISCHARGE INSTRUCTIONS
HOME INSTRUCTIONS  Dr. Radu Bolivar  Orthopedic [place] [facility]  6         Followup with MD in 1 week  Elevate and ice affected extremity for 48 hours  NO SMOKING!!!  NO bathing or swimming  No weightbearing on left leg. You can walk on your right leg with a walker   Please call me if you have any of the following:   Drainage from external fixator site   Temperature over 101.5 degrees   Increasing redness   Tremendous swelling (some swelling is normal)   Pain that can't be controlled    My , Natasha can be reached at [phone] ext [phone].  Leave her a voicemail and she will get back with you promptly.  If you have an absolute emergency, TEXT me with your name and problem at [phone].       Thank you!  Radu Bolivar

## 2023-03-05 NOTE — PLAN OF CARE
Problem: Adult Inpatient Plan of Care  Goal: Plan of Care Review  Recent Flowsheet Documentation  Taken 3/5/2023 1055 by Hugo Casillas, PT  Plan of Care Reviewed With: patient  Outcome Evaluation: PT IE complete.  A&Ox4.  No C/o pain.  Pt independent OOB.  Ambulated 20ft SBA x1 w/ RW NWB LLE.  Pt is safe for DC home w/ A from PT standpoint.  Returning in 10-14 for LLE surgery.  Recommend RW and wheelchair for home.  Would benefit from BSC and shower chair.  PT signing off.  Thank you for referral.   Goal Outcome Evaluation:  Plan of Care Reviewed With: patient           Outcome Evaluation: PT IE complete.  A&Ox4.  No C/o pain.  Pt independent OOB.  Ambulated 20ft SBA x1 w/ RW NWB LLE.  Pt is safe for DC home w/ A from PT standpoint.  Returning in 10-14 for LLE surgery.  Recommend RW and wheelchair for home.  Would benefit from BSC and shower chair.  PT signing off.  Thank you for referral.

## 2023-03-05 NOTE — PROGRESS NOTES
"Progress Note  Nicci Johns  3/5/2023 11:59 CST  Subjective:   Admit Date:   3/3/2023      CC/ADMIT DX:       Interval History:   Reviewed overnight events and nursing notes.  She has no c/o CP or SOA. She has no abdominal pain or N/V. Her pain is controlled. She has not been OOB yet.     I have reviewed all labs/diagnostics from the last 24hrs.       ROS:   I have done a 10 point ROS and all are negative, except what is mentioned in the HPI.    Diet: Regular/House Diet; Texture: Regular Texture (IDDSI 7); Fluid Consistency: Thin (IDDSI 0)    Medications:   lactated ringers, 100 mL/hr, Last Rate: 100 mL/hr (03/03/23 2201)  lactated ringers, 125 mL/hr      aspirin, 325 mg, Oral, Daily  meloxicam, 15 mg, Oral, Daily  nicotine, 1 patch, Transdermal, Q24H  sodium chloride, 10 mL, Intravenous, Q12H  sodium chloride, 3 mL, Intravenous, Q12H            Objective:   Vitals: /64 (BP Location: Right arm, Patient Position: Lying)   Pulse 61   Temp 98.5 °F (36.9 °C) (Oral)   Resp 16   Ht 165.1 cm (65\")   Wt 61.2 kg (135 lb)   LMP  (LMP Unknown)   SpO2 98%   BMI 22.47 kg/m²    Intake/Output Summary (Last 24 hours) at 3/5/2023 1159  Last data filed at 3/4/2023 1953  Gross per 24 hour   Intake --   Output 300 ml   Net -300 ml     General appearance: alert and cooperative with exam  Lungs: clear to auscultation bilaterally  Heart: RRR  Abdomen: soft, non-tender; bowel sounds normal; no masses,  no organomegaly  Extremities: extremities normal, atraumatic, no cyanosis or edema  Neurologic:  No obvious focal neurologic deficits.    Assessment and Plan:     Closed fracture of distal end of left tibia, unspecified fracture morphology, initial encounter    Tobacco Use    Plan:  1.  Continue present medication(s)   2.  PT to work with patient today  3.  Ask SS to assist with d/c planning. Pt is anxious about d/c home.  4.  Labs are stable      Discharge planning:   her home     Reviewed treatment plans with the patient " and/or family.             Electronically signed by Karson Johns MD on 3/5/2023 at 11:59 CST

## 2023-03-05 NOTE — PLAN OF CARE
Goal Outcome Evaluation: patient was medicated once this shift for complaints of pain to the left ankle. Dressing changed this shift as directed by orthopedic surgeon. Patient worked with physical therapy this shift . Patient safety to be maintained this shift, continue to monitor and report abnormal to provider.
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
## 2023-03-06 VITALS
WEIGHT: 135 LBS | OXYGEN SATURATION: 93 % | DIASTOLIC BLOOD PRESSURE: 63 MMHG | HEART RATE: 63 BPM | BODY MASS INDEX: 22.49 KG/M2 | RESPIRATION RATE: 18 BRPM | SYSTOLIC BLOOD PRESSURE: 170 MMHG | HEIGHT: 65 IN | TEMPERATURE: 98.4 F

## 2023-03-06 RX ORDER — ASPIRIN 325 MG
325 TABLET ORAL DAILY
Qty: 30 TABLET | Refills: 0 | Status: SHIPPED | OUTPATIENT
Start: 2023-03-06 | End: 2023-03-06 | Stop reason: SDUPTHER

## 2023-03-06 RX ORDER — ASPIRIN 325 MG
325 TABLET ORAL DAILY
Qty: 60 TABLET | Refills: 0 | Status: SHIPPED | OUTPATIENT
Start: 2023-03-06 | End: 2023-03-21 | Stop reason: HOSPADM

## 2023-03-06 RX ORDER — OXYCODONE HYDROCHLORIDE AND ACETAMINOPHEN 5; 325 MG/1; MG/1
1 TABLET ORAL EVERY 6 HOURS PRN
Qty: 20 TABLET | Refills: 0 | Status: SHIPPED | OUTPATIENT
Start: 2023-03-06 | End: 2023-03-11

## 2023-03-06 RX ORDER — OXYCODONE HYDROCHLORIDE AND ACETAMINOPHEN 5; 325 MG/1; MG/1
1 TABLET ORAL EVERY 6 HOURS PRN
Qty: 20 TABLET | Refills: 0 | Status: SHIPPED | OUTPATIENT
Start: 2023-03-06 | End: 2023-03-06 | Stop reason: SDUPTHER

## 2023-03-06 RX ADMIN — OXYCODONE HYDROCHLORIDE AND ACETAMINOPHEN 1 TABLET: 5; 325 TABLET ORAL at 05:29

## 2023-03-06 RX ADMIN — ASPIRIN 325 MG: 325 TABLET ORAL at 08:47

## 2023-03-06 RX ADMIN — OXYCODONE HYDROCHLORIDE AND ACETAMINOPHEN 1 TABLET: 5; 325 TABLET ORAL at 14:01

## 2023-03-06 RX ADMIN — MELOXICAM 15 MG: 7.5 TABLET ORAL at 08:47

## 2023-03-06 NOTE — PLAN OF CARE
Goal Outcome Evaluation:  Plan of Care Reviewed With: patient        Progress: improving  Outcome Evaluation: A&Ox4.  External fixator with elastic traction in place. No c/o pain. RA. SCD. Up to bsc. Sleeping well.

## 2023-03-06 NOTE — PLAN OF CARE
Goal Outcome Evaluation:  Plan of Care Reviewed With: patient, daughter        Progress: improving  Outcome Evaluation: pt dc'd to home after verbalizing understanding of dc instructions and follow up appts and new medications. IV dc'd with catheter tip intact.

## 2023-03-06 NOTE — PROGRESS NOTES
Subjective:     Post-Operative Day: 3 No complaints     Objective:     Patient Vitals for the past 24 hrs:   BP Temp Temp src Pulse Resp SpO2   03/05/23 2029 153/59 98.6 °F (37 °C) Oral 57 16 98 %   03/05/23 1607 154/64 99.1 °F (37.3 °C) Oral 58 16 96 %   03/05/23 1149 125/64 98.5 °F (36.9 °C) Oral 61 16 98 %   03/05/23 0856 134/52 99.2 °F (37.3 °C) Oral 79 16 97 %       General: alert, appears stated age and cooperative   Wound: External fixator on left ankle in good position. Moderate swelling and bruising to left ankle   Neurovascular: Exam normal   DVT Exam: No evidence of DVT seen on physical exam.         Data Review:  Results from last 7 days   Lab Units 03/05/23  0353 03/04/23  0617   HEMOGLOBIN g/dL 12.9 13.6     Results from last 7 days   Lab Units 03/05/23  0353   SODIUM mmol/L 137   POTASSIUM mmol/L 3.8   CREATININE mg/dL 0.72          Assessment:     Status Post external fixator to left ankle for closed, comminuted distal tibia and fibula. Doing well postoperatively.     Plan:   Ice and elevation of left lower extremity  Plan for delayed ORIF to her fractures in approximately 10 to 14 days after surgery. Will speak with Dr. Chandler today.  Pain control  PT/OT- Nonweightbearing left ankle  DVT prophylaxis  Ice and elevate  Okay from Orthopaedic standpoint to discharge home today if patient does well with PT.  Plan to have this patient follow up with Dr. Chandler soon for planning of next surgery.

## 2023-03-06 NOTE — PAYOR COMM NOTE
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 extend to the tibial plafond. Widening of the lateral ankle mortise and distal tibiofibular syndesmosis. This report was finalized on 03/03/2023 15:13 by Dr. Keisha Brady MD.    XR Foot 2 View Left    Result Date: 3/3/2023  Narrative: EXAMINATION: Left foot 2 views 03/03/2023  HISTORY: Fall with left ankle deformity.  FINDINGS: Two-view exam of the left ankle demonstrates a comminuted fracture involving the distal tibia above the level of the tibial plafond. Based on these 2 views it is difficult to ascertain whether there is intra-articular involvement. There is mild associated soft tissue deformity of the ankle. There is a large plantar spur of the calcaneus. There is arthrosis of the first metatarsophalangeal joint as well as mild arthritic change of the interphalangeal joints.      Impression: 1.. Comminuted fracture of the distal tibia above the tibial plafond. It is difficult to ascertain on these limited views whether there is intra-articular involvement. There is posterior displacement of the proximal fracture fragment. No additional fractures identified.  2. Plantar spur of the calcaneus. This report was finalized on 03/03/2023 15:14 by Dr. Derrek Linda MD.      ED Course  ED Course as of 03/03/23 1607   Fri Mar 03, 2023   1535 I have reassessed patient on numerous occasions.  During the x-rays and after x-rays, patient's remained neurovascularly intact.  She does have palpable pulses.  Normal sensation but limited movement due to the pain.  Pain currently is a 6 out of 10 after receiving fentanyl.  I did speak with KATT Valero with Dr. Vallejo, orthopedic surgeon on-call. they do request medicine admit and they consult.  I did speak with Dr. Senthil Dexter who is gracious to admit the patient under his care.  This has been relayed to the patient who voiced understand plan of care. [TK]      ED Course User Index  [TK] Natalya Dillard PA          [facility]    Final diagnoses:   Closed fracture of distal end of left tibia, unspecified fracture morphology, initial encounter   Closed fracture of distal end of fibula, unspecified fracture morphology, initial encounter          Natalya Dillard PA  03/03/23 1545       Natalya Dillard PA  03/03/23 1558       Natalya Dilalrd PA  03/03/23 1607      Electronically signed by Natalya Dillard PA at 03/03/23 1607         Facility-Administered Medications as of 3/6/2023   Medication Dose Route Frequency Provider Last Rate Last Admin   • acetaminophen (TYLENOL) tablet 650 mg  650 mg Oral Q4H PRN Senthil Dexter MD       • aspirin tablet 325 mg  325 mg Oral Daily LAYNE Vallejo MD   325 mg at 03/05/23 0830   • calcium carbonate (TUMS) chewable tablet 500 mg (200 mg elemental)  2 tablet Oral TID PRN Senthil Dexter MD       • [COMPLETED] ceFAZolin in 0.9% normal saline (ANCEF) IVPB solution 2 g  2 g Intravenous Q8H LAYNE Vallejo  mL/hr at 03/04/23 1159 2 g at 03/04/23 1159   • famotidine (PEPCID) tablet 20 mg  20 mg Oral BID PRN Senthil Dexter MD       •  [COMPLETED] fentaNYL citrate (PF) (SUBLIMAZE) injection 25 mcg  25 mcg Intravenous Once Vinnie Thomas MD   25 mcg at 03/03/23 1447   • HYDROmorphone (DILAUDID) injection 0.5 mg  0.5 mg Intravenous Q2H PRN Senthil Dexter MD   0.5 mg at 03/04/23 1325    And   • naloxone (NARCAN) injection 0.4 mg  0.4 mg Intravenous Q5 Min PRN Senthil Dexter MD       • [COMPLETED] HYDROmorphone (DILAUDID) injection 0.5 mg  0.5 mg Intravenous Once Rebeca Noyola MD   0.5 mg at 03/03/23 1820   • [COMPLETED] HYDROmorphone (DILAUDID) injection 1 mg  1 mg Intravenous Once Vinnie Thomas MD   0.5 mg at 03/03/23 1621   • lactated ringers infusion  100 mL/hr Intravenous Continuous Faby Mustafa  mL/hr at 03/03/23 2201 100 mL/hr at 03/03/23 2201   • lactated ringers infusion  125 mL/hr Intravenous Continuous LAYNE Vallejo MD       • lidocaine PF 1% (XYLOCAINE) injection 0.5 mL  0.5 mL Injection Once PRN Faby Mustafa MD       • melatonin tablet 5 mg  5 mg Oral Nightly PRN Senthil Dexter MD       • meloxicam (MOBIC) tablet 15 mg  15 mg Oral Daily LAYNE Vallejo MD   15 mg at 03/05/23 0830   • morphine injection 4 mg  4 mg Intravenous Q2H PRN LAYNE Vallejo MD        And   • naloxone (NARCAN) injection 0.4 mg  0.4 mg Intravenous Q5 Min PRN LAYNE Vallejo MD       • nicotine (NICODERM CQ) 21 MG/24HR patch 1 patch  1 patch Transdermal Q24H Senthil Dexter MD   1 patch at 03/05/23 2123   • ondansetron (ZOFRAN) tablet 4 mg  4 mg Oral Q6H PRN Senthil Dexter MD        Or   • ondansetron (ZOFRAN) injection 4 mg  4 mg Intravenous Q6H PRN Senthil Dexter MD       • ondansetron (ZOFRAN) tablet 4 mg  4 mg Oral Q6H PRN LAYNE Vallejo MD        Or   • ondansetron (ZOFRAN) injection 4 mg  4 mg Intravenous Q6H PRN LAYNE Vallejo MD       • oxyCODONE (ROXICODONE) immediate release tablet 5 mg  5 mg Oral Q4H PRN LAYNE Vallejo MD   5 mg at 03/04/23 0600   • [COMPLETED]  oxyCODONE-acetaminophen (PERCOCET)  MG per tablet 1 tablet  1 tablet Oral Once PRN Faby Mustafa MD   1 tablet at 23   • oxyCODONE-acetaminophen (PERCOCET) 5-325 MG per tablet 1 tablet  1 tablet Oral Q4H PRN Senthil Dexter MD   1 tablet at 23 0529   • promethazine (PHENERGAN) tablet 12.5 mg  12.5 mg Oral Q6H PRN LAYNE Vallejo MD       • sodium chloride 0.9 % flush 10 mL  10 mL Intravenous PRN Senthil Dexter MD       • sodium chloride 0.9 % flush 10 mL  10 mL Intravenous Q12H Senthil Dexter MD   10 mL at 23   • sodium chloride 0.9 % flush 10 mL  10 mL Intravenous PRN Senthil Dexter MD       • sodium chloride 0.9 % flush 3 mL  3 mL Intravenous Q12H Faby Mustafa MD   3 mL at 23   • sodium chloride 0.9 % flush 3-10 mL  3-10 mL Intravenous PRN Faby Mustafa MD       • sodium chloride 0.9 % infusion 40 mL  40 mL Intravenous PRN Senthil Dexter MD       • sodium chloride 0.9 % infusion 40 mL  40 mL Intravenous PRN Faby Mustafa MD       • traZODone (DESYREL) tablet 25 mg  25 mg Oral Nightly PRN Senthil Dexter MD         Orders (last 72 hrs)      Start     Ordered    23 0706  Discharge patient  Once         23 0707    23 0000  oxyCODONE-acetaminophen (PERCOCET) 5-325 MG per tablet  Every 6 Hours PRN         23 0707    23 0000  aspirin 325 MG tablet  Daily         23 0707    23 1201  Case Management  Consult  Once        Provider:  (Not yet assigned)    23 1201    23 1157  PT Plan of Care Cert / Re-Cert  Once        Comments: Physical Therapy Plan of Care  Initial Certification  Certification Period: 3/5/2023 - 6/3/2023    Patient Name: Nicci Johns  : 1955    (S82.048A) Closed fracture of distal end of left tibia, unspecified fracture morphology, initial encounter  (primary encounter diagnosis)  (S82.806K) Closed fracture of distal  end of fibula, unspecified fracture morphology, initial encounter  (Z74.09) Impaired mobility                  Assessment  PT Assessment  Criteria for Skilled Interventions Met (PT): does not meet criteria for skilled intervention                    Plan  PT Plan  Therapy Frequency (PT): evaluation only  Outcome Evaluation: PT IE complete.  A&Ox4.  No C/o pain.  Pt independent OOB.  Ambulated 20ft SBA x1 w/ RW NWB LLE.  Pt is safe for DC home w/ A from PT standpoint.  Returning in 10-14 for LLE surgery.  Recommend RW and wheelchair for home.  Would benefit from BSC and shower chair.  PT signing off.  Thank you for referral.        Hugo Casillas, PT  3/5/2023            By cosigning this order, either electronically or physically, I certify that the therapy services are furnished while this patient is under my care, the services outline above are required by this patient, and will be reviewed every 90 days.        M.D.:__________________________________________ Date: ______________    03/05/23 1156    03/05/23 0600  Hemoglobin A1c  Once         03/04/23 1548    03/04/23 1600  Vital Signs  Every 8 Hours         03/03/23 2140    03/04/23 1600  Neurovascular Checks  Every 8 Hours         03/03/23 2140    03/04/23 0900  meloxicam (MOBIC) tablet 15 mg  Daily         03/03/23 2140    03/04/23 0900  aspirin tablet 325 mg  Daily         03/03/23 2140    03/04/23 0803  PT Consult: Eval & Treat Non Weight Bearing; Functional Mobility Below Baseline  Once         03/04/23 0803    03/04/23 0800  Oral Care  2 Times Daily       03/03/23 2140    03/04/23 0800  Up in Chair 3x / Day - Beginning POD 1  3 Times Daily         03/03/23 2140    03/04/23 0800  Ambulate in Khan 4x / Day Beginning POD 1  4 Times Daily       03/03/23 2140    03/04/23 0600  CBC Auto Differential  Morning Draw         03/03/23 2140 03/04/23 0600  Basic Metabolic Panel  Morning Draw,   Status:  Canceled         03/03/23 2140    03/04/23 0400  ceFAZolin in  0.9% normal saline (ANCEF) IVPB solution 2 g  Every 8 Hours         03/03/23 2140 03/04/23 0000  Vital Signs  Every 4 Hours       03/03/23 2140 03/04/23 0000  Neurovascular Checks  Every 4 Hours      Comments: On fractured extremity    03/03/23 2140 03/04/23 0000  Vital Signs  Every 4 Hours       03/03/23 2140 03/04/23 0000  Neurovascular Checks  Every 4 Hours       03/03/23 2140 03/03/23 2230  sodium chloride 0.9 % flush 10 mL  Every 12 Hours Scheduled         03/03/23 2140 03/03/23 2230  nicotine (NICODERM CQ) 21 MG/24HR patch 1 patch  Every 24 Hours         03/03/23 2140 03/03/23 2230  sodium chloride 0.9 % flush 3 mL  Every 12 Hours Scheduled         03/03/23 2140 03/03/23 2230  lactated ringers infusion  Continuous         03/03/23 2140 03/03/23 2230  lactated ringers infusion  Continuous         03/03/23 2140 03/03/23 2200  Turn, Cough & Deep Breathe Every 2 Hours Until Ambulating  Every 2 Hours       03/03/23 2140 03/03/23 2200  Incentive Spirometry  Every 2 Hours While Awake      Comments: Until lungs are clear and no productive cough.    03/03/23 2140 03/03/23 2141  Intake & Output  Every Shift       03/03/23 2140 03/03/23 2141  Weigh Patient  Once         03/03/23 2140 03/03/23 2141  Oxygen Therapy- Nasal Cannula; Titrate for SPO2: 90% - 95%  Continuous,   Status:  Canceled         03/03/23 2140 03/03/23 2141  Insert Peripheral IV  Once         03/03/23 2140 03/03/23 2141  Saline Lock & Maintain IV Access  Continuous         03/03/23 2140 03/03/23 2141  Place Sequential Compression Device  Once         03/03/23 2140 03/03/23 2141  Maintain Sequential Compression Device  Continuous         03/03/23 2140 03/03/23 2141  Cardiac Monitoring  Continuous        Comments: Follow Standing Orders As Outlined in Process Instructions (Open Order Report to View Full Instructions)    03/03/23 2140 03/03/23 2141  NPO Diet NPO Type: Strict NPO  Diet Effective  Now,   Status:  Canceled         03/03/23 2140    03/03/23 2141  Oxygen Therapy- Nasal Cannula; Titrate for SPO2: equal to or greater than, 90%  Continuous,   Status:  Canceled         03/03/23 2140    03/03/23 2141  Pulse Oximetry, Continuous  Continuous         03/03/23 2140 03/03/23 2141  Insert Peripheral IV  Once         03/03/23 2140    03/03/23 2141  Saline Lock & Maintain IV Access  Continuous,   Status:  Canceled         03/03/23 2140    03/03/23 2141  Code Status and Medical Interventions:  Continuous         03/03/23 2140    03/03/23 2141  Intake and Output  Every Shift       03/03/23 2140 03/03/23 2141  Up in Chair x 1 Day of Surgery  Once         03/03/23 2140 03/03/23 2141  Same Day Discharge Patients - Up in Chair x2 on Day of Surgery  Every Shift       03/03/23 2140 03/03/23 2141  Ambulate in Khan x1 Day of Surgery  Once         03/03/23 2140 03/03/23 2141  Pillows May Be Used to Elevate Operative Leg, But Do NOT Flex Operative Knee Over a Pillow  Until Discontinued         03/03/23 2140 03/03/23 2141  Instruct Patient to Do At Least 10 Ankle Pumps Per Hour While Awake  Once        Comments: Instruct Patient to Do At Least 10 Ankle Pumps Per Hour While Awake    03/03/23 2140 03/03/23 2141  Saline Lock IV With Adequate PO Intake  Continuous         03/03/23 2140 03/03/23 2141  Change Dressing  Per Order Details        Comments: May Discontinue Dressing Changes When No Drainage From Wound.    03/03/23 2140 03/03/23 2141  Oxygen Therapy-  Continuous         03/03/23 2140 03/03/23 2141  Diet: Regular/House Diet; Texture: Regular Texture (IDDSI 7); Fluid Consistency: Thin (IDDSI 0)  Diet Effective Now         03/03/23 2140    03/03/23 2141  Advance Diet as Tolerated  Until Discontinued         03/03/23 2140 03/03/23 2141  Basic Metabolic Panel  Daily       03/03/23 2140    03/03/23 2141  Hemoglobin & Hematocrit, Blood  Daily       03/03/23 2140    03/03/23 2141  Inpatient  Consult to Hospitalist  Once        Specialty:  Hospitalist  Provider:  (Not yet assigned)    03/03/23 2140 03/03/23 2141  Inpatient Case Management  Consult  Once        Comments: Discharge planning   Provider:  (Not yet assigned)    03/03/23 2140 03/03/23 2141  Weight Bearing - Non-Weight Bearing  Continuous         03/03/23 2140 03/03/23 2141  No Physical / Occupational Therapy Needed  Once         03/03/23 2140 03/03/23 2140  HYDROmorphone (DILAUDID) injection 0.5 mg  Every 2 Hours PRN        See Hyperspace for full Linked Orders Report.    03/03/23 2140 03/03/23 2140  naloxone (NARCAN) injection 0.4 mg  Every 5 Minutes PRN        See Hyperspace for full Linked Orders Report.    03/03/23 2140 03/03/23 2140  ondansetron (ZOFRAN) tablet 4 mg  Every 6 Hours PRN        See Hyperspace for full Linked Orders Report.    03/03/23 2140 03/03/23 2140  ondansetron (ZOFRAN) injection 4 mg  Every 6 Hours PRN        See Hyperspace for full Linked Orders Report.    03/03/23 2140 03/03/23 2140  traZODone (DESYREL) tablet 25 mg  Nightly PRN         03/03/23 2140    03/03/23 2140  oxyCODONE (ROXICODONE) immediate release tablet 5 mg  Every 4 Hours PRN         03/03/23 2140    03/03/23 2140  morphine injection 4 mg  Every 2 Hours PRN        See Hyperspace for full Linked Orders Report.    03/03/23 2140 03/03/23 2140  naloxone (NARCAN) injection 0.4 mg  Every 5 Minutes PRN        See Hyperspace for full Linked Orders Report.    03/03/23 2140 03/03/23 2140  ondansetron (ZOFRAN) tablet 4 mg  Every 6 Hours PRN        See Hyperspace for full Linked Orders Report.    03/03/23 2140 03/03/23 2140  ondansetron (ZOFRAN) injection 4 mg  Every 6 Hours PRN        See Hyperspace for full Linked Orders Report.    03/03/23 2140 03/03/23 2140  promethazine (PHENERGAN) tablet 12.5 mg  Every 6 Hours PRN         03/03/23 2140    03/03/23 2140  sodium chloride 0.9 % flush 10 mL  As Needed          03/03/23 2140 03/03/23 2140  sodium chloride 0.9 % infusion 40 mL  As Needed         03/03/23 2140 03/03/23 2140  acetaminophen (TYLENOL) tablet 650 mg  Every 4 Hours PRN         03/03/23 2140 03/03/23 2140  oxyCODONE-acetaminophen (PERCOCET) 5-325 MG per tablet 1 tablet  Every 4 Hours PRN         03/03/23 2140 03/03/23 2140  melatonin tablet 5 mg  Nightly PRN         03/03/23 2140 03/03/23 2140  calcium carbonate (TUMS) chewable tablet 500 mg (200 mg elemental)  3 Times Daily PRN         03/03/23 2140 03/03/23 2140  famotidine (PEPCID) tablet 20 mg  2 Times Daily PRN         03/03/23 2140 03/03/23 2140  sodium chloride 0.9 % flush 3-10 mL  As Needed         03/03/23 2140 03/03/23 2140  sodium chloride 0.9 % infusion 40 mL  As Needed         03/03/23 2140 03/03/23 2140  lidocaine PF 1% (XYLOCAINE) injection 0.5 mL  Once As Needed         03/03/23 2140 03/03/23 2046  Vital signs every 5 minutes for 15 minutes, every 15 minutes thereafter.  Once,   Status:  Canceled         03/03/23 2045 03/03/23 2046  Call Anesthesiologist for additional IV Fluid bolus for Hypotension/Tachycardia  Continuous,   Status:  Canceled         03/03/23 2045 03/03/23 2046  Notify Anesthesia of Any Acute Changes in Patient Condition  Until Discontinued,   Status:  Canceled         03/03/23 2045 03/03/23 2046  Notify Anesthesia for Unrelieved Pain  Until Discontinued,   Status:  Canceled         03/03/23 2045 03/03/23 2046  POC Glucose STAT  STAT,   Status:  Canceled        Comments: Post op Glucose Check on All Diabetic Patients, Notify Anesthesia if Blood Sugar is Less Than 80 mg/dL or Greater Than 250mg/dL      03/03/23 2045 03/03/23 2046  Once DC criteria to floor met, follow surgeon's orders.  Until Discontinued,   Status:  Canceled         03/03/23 2045 03/03/23 2046  Discharge patient from PACU when discharge criteria is met.  Until Discontinued,   Status:  Canceled         03/03/23 3950     03/03/23 2045  Apply warming blanket  As Needed,   Status:  Canceled      Comments: For a recorded temp of <36.9 C    03/03/23 2045 03/03/23 2045  oxyCODONE-acetaminophen (PERCOCET)  MG per tablet 1 tablet  Once As Needed         03/03/23 2045 03/03/23 2045  HYDROmorphone (DILAUDID) injection 0.5 mg  Every 10 Minutes PRN,   Status:  Discontinued         03/03/23 2045 03/03/23 2045  fentaNYL citrate (PF) (SUBLIMAZE) injection 25 mcg  Every 5 Minutes PRN,   Status:  Discontinued         03/03/23 2045 03/03/23 2045  labetalol (NORMODYNE,TRANDATE) injection 5 mg  Every 5 Minutes PRN,   Status:  Discontinued         03/03/23 2045 03/03/23 2045  atropine sulfate injection 0.5 mg  Once As Needed,   Status:  Discontinued         03/03/23 2045 03/03/23 2045  naloxone (NARCAN) injection 0.04 mg  As Needed,   Status:  Discontinued         03/03/23 2045 03/03/23 2045  flumazenil (ROMAZICON) injection 0.2 mg  As Needed,   Status:  Discontinued         03/03/23 2045 03/03/23 2045  ondansetron (ZOFRAN) injection 4 mg  Every 15 Minutes PRN,   Status:  Discontinued         03/03/23 2045 03/03/23 2045  droperidol (INAPSINE) injection 0.625 mg  Once As Needed,   Status:  Discontinued        See Hyperspace for full Linked Orders Report.    03/03/23 2045 03/03/23 2045  droperidol (INAPSINE) injection 0.625 mg  Once As Needed,   Status:  Discontinued        See Hyperspace for full Linked Orders Report.    03/03/23 2045 03/03/23 2034  CT Lower Extremity Left Without Contrast  1 Time Imaging         03/03/23 2034 03/03/23 2009  sodium chloride (NS) irrigation solution  As Needed,   Status:  Discontinued         03/03/23 2009 03/03/23 1946  FL C Arm During Surgery  1 Time Imaging         03/03/23 1945 03/03/23 1945  XR Ankle 3+ View Left  1 Time Imaging         03/03/23 1944 03/03/23 1807  HYDROmorphone (DILAUDID) injection 0.5 mg  Once         03/03/23 1805    03/03/23 1641  Urinalysis,  Microscopic Only - Straight Cath  Once         03/03/23 1640    03/03/23 1612  Urinalysis With Culture If Indicated - Straight Cath  Once         03/03/23 1612    03/03/23 1608  HYDROmorphone (DILAUDID) injection 1 mg  Once         03/03/23 1606    03/03/23 1557  Inpatient Admission  Once         03/03/23 1557    03/03/23 1557  XR Chest 1 View  1 Time Imaging         03/03/23 1557    03/03/23 1545  Inpatient Admission  Once         03/03/23 1544    03/03/23 1544  NPO Diet NPO Type: Strict NPO  Diet Effective Now,   Status:  Canceled         03/03/23 1544    03/03/23 1544  Ortho (on-call MD unless specified)  Once        Specialty:  Orthopedic Surgery  Provider:  LAYNE Vallejo MD    03/03/23 1544    03/03/23 1506  XR Foot 2 View Left  1 Time Imaging         03/03/23 1425    03/03/23 1502  XR Knee 1 or 2 View Left  1 Time Imaging         03/03/23 1425    03/03/23 1433  fentaNYL citrate (PF) (SUBLIMAZE) injection 25 mcg  Once         03/03/23 1431    03/03/23 1433  Comprehensive Metabolic Panel  Once         03/03/23 1432    03/03/23 1433  Protime-INR  Once         03/03/23 1432    03/03/23 1433  aPTT  Once         03/03/23 1432    03/03/23 1433  Insert Peripheral IV  Once        See Hyperspace for full Linked Orders Report.    03/03/23 1432    03/03/23 1432  sodium chloride 0.9 % flush 10 mL  As Needed        See Hyperspace for full Linked Orders Report.    03/03/23 1432    03/03/23 1432  CBC & Differential  Once         03/03/23 1432    03/03/23 1432  CBC Auto Differential  PROCEDURE ONCE         03/03/23 1432    03/03/23 1425  XR Knee 3 View Left  1 Time Imaging,   Status:  Canceled         03/03/23 1425    03/03/23 1425  XR Tibia Fibula 2 View Left  1 Time Imaging         03/03/23 1425    03/03/23 1425  XR Ankle 3+ View Left  1 Time Imaging         03/03/23 1425    03/03/23 1425  XR Foot 3+ View Left  1 Time Imaging,   Status:  Canceled         03/03/23 1425    Unscheduled  Vital Signs - Per Anesthesia Protocol   As Needed       03/03/23 2140    Unscheduled  Ice to Incision for 48 Hours  As Needed       03/03/23 2140    Unscheduled  Apply Ice to Incision PRN for Edema, After Activity or Exercise, and to Lessen Discomfort  As Needed       03/03/23 2140    Unscheduled  May Stand to Void or Use Bedside Commode Day of Surgery. POD 1 & Thereafter Use Bedside Commode or Bathroom  As Needed       03/03/23 2140    Unscheduled  Incentive Spirometry  As Needed      Comments: PRN Every 2 Hours While Awake.    03/03/23 2140    --  SCANNED - TELEMETRY           03/03/23 0000    --  SCANNED - TELEMETRY           03/03/23 0000                   Physician Progress Notes (last 72 hours)      LAYNE Vallejo MD at 03/06/23 0704          Subjective:     Post-Operative Day: 3 No complaints     Objective:     Patient Vitals for the past 24 hrs:   BP Temp Temp src Pulse Resp SpO2   03/05/23 2029 153/59 98.6 °F (37 °C) Oral 57 16 98 %   03/05/23 1607 154/64 99.1 °F (37.3 °C) Oral 58 16 96 %   03/05/23 1149 125/64 98.5 °F (36.9 °C) Oral 61 16 98 %   03/05/23 0856 134/52 99.2 °F (37.3 °C) Oral 79 16 97 %       General: alert, appears stated age and cooperative   Wound: External fixator on left ankle in good position. Moderate swelling and bruising to left ankle   Neurovascular: Exam normal   DVT Exam: No evidence of DVT seen on physical exam.         Data Review:  Results from last 7 days   Lab Units 03/05/23  0353 03/04/23  0617   HEMOGLOBIN g/dL 12.9 13.6     Results from last 7 days   Lab Units 03/05/23  0353   SODIUM mmol/L 137   POTASSIUM mmol/L 3.8   CREATININE mg/dL 0.72          Assessment:     Status Post external fixator to left ankle for closed, comminuted distal tibia and fibula. Doing well postoperatively.     Plan:   Ice and elevation of left lower extremity  Plan for delayed ORIF to her fractures in approximately 10 to 14 days after surgery. Will speak with Dr. Chandler today.  Pain control  PT/OT- Nonweightbearing left ankle  DVT  "prophylaxis  Ice and elevate  Okay from Orthopaedic standpoint to discharge home today if patient does well with PT.  Plan to have this patient follow up with Dr. Ramesh kim for planning of next surgery.    Electronically signed by LAYNE Vallejo MD at 03/06/23 0706     Karson Johns MD at 03/05/23 1158          Progress Note  Nicci Andreas  3/5/2023 11:59 CST  Subjective:   Admit Date:   3/3/2023      CC/ADMIT DX:       Interval History:   Reviewed overnight events and nursing notes.  She has no c/o CP or SOA. She has no abdominal pain or N/V. Her pain is controlled. She has not been OOB yet.     I have reviewed all labs/diagnostics from the last 24hrs.       ROS:   I have done a 10 point ROS and all are negative, except what is mentioned in the HPI.    Diet: Regular/House Diet; Texture: Regular Texture (IDDSI 7); Fluid Consistency: Thin (IDDSI 0)    Medications:   lactated ringers, 100 mL/hr, Last Rate: 100 mL/hr (03/03/23 2201)  lactated ringers, 125 mL/hr      aspirin, 325 mg, Oral, Daily  meloxicam, 15 mg, Oral, Daily  nicotine, 1 patch, Transdermal, Q24H  sodium chloride, 10 mL, Intravenous, Q12H  sodium chloride, 3 mL, Intravenous, Q12H            Objective:   Vitals: /64 (BP Location: Right arm, Patient Position: Lying)   Pulse 61   Temp 98.5 °F (36.9 °C) (Oral)   Resp 16   Ht 165.1 cm (65\")   Wt 61.2 kg (135 lb)   LMP  (LMP Unknown)   SpO2 98%   BMI 22.47 kg/m²    Intake/Output Summary (Last 24 hours) at 3/5/2023 1159  Last data filed at 3/4/2023 1953  Gross per 24 hour   Intake --   Output 300 ml   Net -300 ml     General appearance: alert and cooperative with exam  Lungs: clear to auscultation bilaterally  Heart: RRR  Abdomen: soft, non-tender; bowel sounds normal; no masses,  no organomegaly  Extremities: extremities normal, atraumatic, no cyanosis or edema  Neurologic:  No obvious focal neurologic deficits.    Assessment and Plan:     Closed fracture of distal end of left tibia, " unspecified fracture morphology, initial encounter    Tobacco Use    Plan:  1.  Continue present medication(s)   2.  PT to work with patient today  3.  Ask SS to assist with d/c planning. Pt is anxious about d/c home.  4.  Labs are stable      Discharge planning:   her home     Reviewed treatment plans with the patient and/or family.             Electronically signed by Karson Johns MD on 3/5/2023 at 11:59 CST    Electronically signed by Karson Johns MD at 03/05/23 1200     LAYNE Vallejo MD at 03/05/23 0758          Subjective:     Post-Operative Day: 2 No complaints     Objective:     Patient Vitals for the past 24 hrs:   BP Temp Temp src Pulse Resp SpO2   03/04/23 2357 137/62 98.7 °F (37.1 °C) Oral 77 16 96 %   03/04/23 1953 155/67 98.8 °F (37.1 °C) Oral 74 16 94 %   03/04/23 1555 132/68 98.4 °F (36.9 °C) Oral 71 18 97 %   03/04/23 1206 134/54 98.4 °F (36.9 °C) Oral 64 16 94 %       General: alert, appears stated age and cooperative   Wound: External fixator on left ankle in good position. Moderate swelling and bruising to left ankle   Neurovascular: Exam normal   DVT Exam: No evidence of DVT seen on physical exam.         Data Review:  Results from last 7 days   Lab Units 03/05/23  0353 03/04/23  0617   HEMOGLOBIN g/dL 12.9 13.6     Results from last 7 days   Lab Units 03/05/23  0353   SODIUM mmol/L 137   POTASSIUM mmol/L 3.8   CREATININE mg/dL 0.72          Assessment:     Status Post external fixator to left ankle for closed, comminuted distal tibia and fibula. Doing well postoperatively.     Plan:   Ice and elevation of left lower extremity  Plan for delayed ORIF to her fractures in approximately 10 to 14 days  Pain control  PT/OT- Nonweightbearing left ankle  DVT prophylaxis  Ice and elevate  Okay from Orthopaedic standpoint to discharge home today if patient does well with PT.  Plan to see this patient back in my office in 1 week and repeat X rays at that time.     Electronically signed  "by LAYNE Vallejo MD at 03/05/23 0801     LAYNE Vallejo MD at 03/04/23 0757          Subjective:     Post-Operative Day: 1 No complaints other than left ankle surgery pain.     Objective:     Patient Vitals for the past 24 hrs:   BP Temp Temp src Pulse Resp SpO2 Height Weight   03/04/23 0716 176/77 98 °F (36.7 °C) Oral 90 18 93 % -- --   03/04/23 0353 123/72 98.4 °F (36.9 °C) Oral 71 18 94 % -- --   03/03/23 2308 150/65 98.5 °F (36.9 °C) Oral 78 18 93 % -- --   03/03/23 2225 159/67 98.2 °F (36.8 °C) Oral 81 18 94 % -- --   03/03/23 2155 106/82 98.4 °F (36.9 °C) Oral 91 16 92 % -- --   03/03/23 2120 164/87 98.8 °F (37.1 °C) Temporal 95 14 98 % -- --   03/03/23 2110 165/73 -- -- 94 14 96 % -- --   03/03/23 2100 147/69 -- -- 72 14 94 % -- --   03/03/23 2055 161/67 -- -- 92 14 99 % -- --   03/03/23 2050 153/75 -- -- 91 12 100 % -- --   03/03/23 2045 147/89 -- -- 96 12 100 % -- --   03/03/23 2042 147/69 97.7 °F (36.5 °C) Temporal 96 12 100 % -- --   03/03/23 1559 139/71 -- -- 65 17 96 % -- --   03/03/23 1417 133/69 98.5 °F (36.9 °C) Oral 90 13 98 % 165.1 cm (65\") 61.2 kg (135 lb)       General: alert, appears stated age and cooperative   Wound: External fixator on left ankle in good position. Moderate swelling and bruising to left ankle   Neurovascular: Exam normal   DVT Exam: No evidence of DVT seen on physical exam.         Data Review:  Results from last 7 days   Lab Units 03/04/23  0617 03/03/23  2150   HEMOGLOBIN g/dL 13.6 13.5     Results from last 7 days   Lab Units 03/04/23  0617   SODIUM mmol/L 132*   POTASSIUM mmol/L 4.4   CREATININE mg/dL 0.80          Assessment:     Status Post external fixator to left ankle for closed, comminuted distal tibia and fibula. Doing well postoperatively. . Acute postoperative anemia    Plan:   Ice and elevation of left lower extremity  CT left ankle  Plan for delayed ORIF to her fractures in approximately 10 to 14 days  Pain control  PT/OT- Nonweightbearing left ankle  DVT " prophylaxis  Ice and elevate  Discharge home in a few days      Electronically signed by LAYNE Vallejo MD at 03/04/23 0802
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
## 2023-03-06 NOTE — DISCHARGE SUMMARY
Hospital Discharge Summary    Nicci Johns  :  1955  MRN:  [id number]    Admit date:  3/3/2023  Discharge date:  3/6/2023    Admitting Physician:    Senthil Dexter MD    Discharge Diagnoses:      Closed fracture of distal end of left tibia, unspecified fracture morphology, initial encounter      Hospital Course:       Ms. Johns is a 67-year-old female without significant medical history presented with mechanical fall resulting in left ankle fracture.  She is evaluated by orthopedic surgery who placed external fixators and plan to provide definitive fixation of her fracture in 1 to 2 weeks.  She did well with physical therapy and will be discharged on 3/6.  Of note, she does not have regular primary care provider.  I recommend she obtain one.  She is more than welcome to follow-up with myself.    Discharge Medications:         Discharge Medications      New Medications      Instructions Start Date   aspirin 325 MG tablet   325 mg, Oral, Daily      oxyCODONE-acetaminophen 5-325 MG per tablet  Commonly known as: PERCOCET   1 tablet, Oral, Every 6 Hours PRN             Consults:     Significant Diagnostic Studies:      XR Knee 1 or 2 View Left    Result Date: 3/3/2023  1. Early arthritic change with no acute osseous injury of the left knee. This report was finalized on 2023 15:11 by Dr. Keisha Brady MD.    XR Tibia Fibula 2 View Left    Result Date: 3/3/2023  1. Comminuted distal tibia and fibula diaphyses fractures with lateral displacement and angulation (apex medial). Widening of the distal tibiofibular syndesmosis and ankle mortise. This report was finalized on 2023 15:16 by Dr. Keisha Brady MD.    XR Ankle 3+ View Left    Result Date: 3/3/2023   Intraoperative fluoroscopic images during placement of LEFT ankle external fixator.  Please refer to the operative note for more details. This report was finalized on 2023 20:53 by Dr. Cliff Emerson MD.    XR Ankle 3+ View  Left    Result Date: 3/3/2023  1. Comminuted fractures of the left distal tibia and fibula shafts. The distal tibia fracture line may extend to the tibial plafond. Widening of the lateral ankle mortise and distal tibiofibular syndesmosis. This report was finalized on 03/03/2023 15:13 by Dr. Keisha Brady MD.    XR Foot 2 View Left    Result Date: 3/3/2023  1.. Comminuted fracture of the distal tibia above the tibial plafond. It is difficult to ascertain on these limited views whether there is intra-articular involvement. There is posterior displacement of the proximal fracture fragment. No additional fractures identified. 2. Plantar spur of the calcaneus. This report was finalized on 03/03/2023 15:14 by Dr. Derrek Linda MD.    XR Chest 1 View    Result Date: 3/3/2023  1. Mild hyperinflation of the lungs with no acute process identified. This report was finalized on 03/03/2023 16:28 by Dr. Keisha Brady MD.    CT Lower Extremity Left Without Contrast    Result Date: 3/4/2023  As above.  This report was finalized on 03/04/2023 10:24 by Dr. Stephan Guajardo MD.     Disposition:   Home  Follow up with Provider, No Known in 1 weeks.  Offered services with [facility] if she wants.    Signed:  Senthil Dexter MD   3/6/2023, 07:07 CST

## 2023-03-07 NOTE — PAYOR COMM NOTE
"REF:  [id number]    [facility]  [place],   [phone]  OR  FAX   [phone]        Nicci Johns (67 y.o. Female)     Date of Birth   1955    Social Security Number       Address   [address] [place] [address]    Home Phone   [phone]    MRN   [id number]       Muslim   Other    Marital Status   Single                            Admission Date   3/3/23    Admission Type   Emergency    Admitting Provider   Senthil Dexter MD    Attending Provider       Department, Room/Bed   [facility] 3A, 355/1       Discharge Date   3/6/2023    Discharge Disposition   [place] [facility] [place]    [place]charge Destination                               Attending Provider: (none)   Allergies: No Known Allergies    Isolation: None   Infection: None   Code Status: Prior    Ht: 165.1 cm (65\")   Wt: 61.2 kg (135 lb)    Admission Cmt: None   Principal Problem: Closed fracture of distal end of left tibia, unspecified fracture morphology, initial encounter [S82.302A]                 Active Insurance as of 3/3/2023     Primary Coverage     Payor Plan Insurance Group Employer/Plan Group    [employer]HEM MEDICAID ANTHEM MEDICAID [id number]     Payor Plan Address Payor Plan Phone Number Payor Plan Fax Number Effective Dates    [address] [address] [phone]  2023 - None Entered    [facility] [id number]       Subscriber Name Subscriber Birth Date Member ID       NICCI JOHNS 1955 [id number]                 Emergency Contacts      (Rel.) Home Phone Work Phone Mobile Phone    Romana Johns (Daughter) [phone] -- [phone]               Discharge Summary      Senthil Dexter MD at 23 0707              Hospital Discharge Summary    Nicci Johns  :  1955  MRN:  [id number]    Admit date:  3/3/2023  Discharge date:  3/6/2023    Admitting Physician:    Senthil Dexter MD    Discharge Diagnoses:      Closed fracture of distal end of left tibia, " unspecified fracture morphology, initial encounter      Hospital Course:       Ms. Johns is a 67-year-old female without significant medical history presented with mechanical fall resulting in left ankle fracture.  She is evaluated by orthopedic surgery who placed external fixators and plan to provide definitive fixation of her fracture in 1 to 2 weeks.  She did well with physical therapy and will be discharged on 3/6.  Of note, she does not have regular primary care provider.  I recommend she obtain one.  She is more than welcome to follow-up with myself.    Discharge Medications:         Discharge Medications      New Medications      Instructions Start Date   aspirin 325 MG tablet   325 mg, Oral, Daily      oxyCODONE-acetaminophen 5-325 MG per tablet  Commonly known as: PERCOCET   1 tablet, Oral, Every 6 Hours PRN             Consults:     Significant Diagnostic Studies:      XR Knee 1 or 2 View Left    Result Date: 3/3/2023  1. Early arthritic change with no acute osseous injury of the left knee. This report was finalized on 03/03/2023 15:11 by Dr. Keisha Brady MD.    XR Tibia Fibula 2 View Left    Result Date: 3/3/2023  1. Comminuted distal tibia and fibula diaphyses fractures with lateral displacement and angulation (apex medial). Widening of the distal tibiofibular syndesmosis and ankle mortise. This report was finalized on 03/03/2023 15:16 by Dr. Keisha Brady MD.    XR Ankle 3+ View Left    Result Date: 3/3/2023   Intraoperative fluoroscopic images during placement of LEFT ankle external fixator.  Please refer to the operative note for more details. This report was finalized on 03/03/2023 20:53 by Dr. Cliff Emerson MD.    XR Ankle 3+ View Left    Result Date: 3/3/2023  1. Comminuted fractures of the left distal tibia and fibula shafts. The distal tibia fracture line may extend to the tibial plafond. Widening of the lateral ankle mortise and distal tibiofibular syndesmosis. This report was finalized  on 03/03/2023 15:13 by Dr. Keisha Brady MD.    XR Foot 2 View Left    Result Date: 3/3/2023  1.. Comminuted fracture of the distal tibia above the tibial plafond. It is difficult to ascertain on these limited views whether there is intra-articular involvement. There is posterior displacement of the proximal fracture fragment. No additional fractures identified. 2. Plantar spur of the calcaneus. This report was finalized on 03/03/2023 15:14 by Dr. Derrek Linda MD.    XR Chest 1 View    Result Date: 3/3/2023  1. Mild hyperinflation of the lungs with no acute process identified. This report was finalized on 03/03/2023 16:28 by Dr. Keisha Brady MD.    CT Lower Extremity Left Without Contrast    Result Date: 3/4/2023  As above.  This report was finalized on 03/04/2023 10:24 by Dr. Stephan Guajardo MD.     Disposition:   Home  Follow up with Provider, No Known in 1 weeks.  Offered services with [facility] if she wants.    Signed:  Natalia Rizo MD   3/6/2023, 07:07 CST      Electronically signed by Natalia Rizo MD at 03/06/23 0709       Discharge Order (From admission, onward)     Start     Ordered    03/06/23 0706  Discharge patient  Once        Expected Discharge Date: 03/06/23    Expected Discharge Time: Morning    Discharge Disposition: Home-Health Care [facility] [place]    Physician of Record for Attribution - Please select from Treatment Team: NATALIA RIZO [[id number]]    Review needed by CMO to determine Physician of Record: No       Question Answer Comment   Physician of Record for Attribution - Please select from Treatment Team NATALIA RIZO    Review needed by CMO to determine Physician of Record No        03/06/23 0707

## 2023-03-21 ENCOUNTER — HOSPITAL ENCOUNTER (OUTPATIENT)
Facility: HOSPITAL | Age: 68
Setting detail: HOSPITAL OUTPATIENT SURGERY
Discharge: HOME | End: 2023-03-21
Attending: PODIATRIST | Admitting: PODIATRIST
Payer: MEDICAID

## 2023-03-21 ENCOUNTER — ANESTHESIA EVENT (OUTPATIENT)
Dept: PERIOP | Facility: HOSPITAL | Age: 68
End: 2023-03-21
Payer: MEDICAID

## 2023-03-21 ENCOUNTER — APPOINTMENT (OUTPATIENT)
Dept: GENERAL RADIOLOGY | Facility: HOSPITAL | Age: 68
End: 2023-03-21
Payer: MEDICAID

## 2023-03-21 ENCOUNTER — ANESTHESIA (OUTPATIENT)
Dept: PERIOP | Facility: HOSPITAL | Age: 68
End: 2023-03-21
Payer: MEDICAID

## 2023-03-21 VITALS
HEIGHT: 65 IN | TEMPERATURE: 97.9 F | SYSTOLIC BLOOD PRESSURE: 127 MMHG | OXYGEN SATURATION: 98 % | RESPIRATION RATE: 16 BRPM | BODY MASS INDEX: 22.63 KG/M2 | HEART RATE: 75 BPM | WEIGHT: 135.8 LBS | DIASTOLIC BLOOD PRESSURE: 65 MMHG

## 2023-03-21 DIAGNOSIS — S82.302A CLOSED FRACTURE OF DISTAL END OF LEFT TIBIA, UNSPECIFIED FRACTURE MORPHOLOGY, INITIAL ENCOUNTER: Primary | ICD-10-CM

## 2023-03-21 PROCEDURE — 25010000002 ROPIVACAINE PER 1 MG: Performed by: ANESTHESIOLOGY

## 2023-03-21 PROCEDURE — 25010000002 FENTANYL CITRATE (PF) 100 MCG/2ML SOLUTION: Performed by: NURSE ANESTHETIST, CERTIFIED REGISTERED

## 2023-03-21 PROCEDURE — C1713 ANCHOR/SCREW BN/BN,TIS/BN: HCPCS | Performed by: PODIATRIST

## 2023-03-21 PROCEDURE — 73600 X-RAY EXAM OF ANKLE: CPT

## 2023-03-21 PROCEDURE — 25010000002 PROPOFOL 10 MG/ML EMULSION: Performed by: NURSE ANESTHETIST, CERTIFIED REGISTERED

## 2023-03-21 PROCEDURE — 76000 FLUOROSCOPY <1 HR PHYS/QHP: CPT

## 2023-03-21 PROCEDURE — 25010000002 MIDAZOLAM PER 1 MG: Performed by: ANESTHESIOLOGY

## 2023-03-21 PROCEDURE — 25010000002 FENTANYL CITRATE (PF) 50 MCG/ML SOLUTION: Performed by: ANESTHESIOLOGY

## 2023-03-21 PROCEDURE — 25010000002 ONDANSETRON PER 1 MG: Performed by: NURSE ANESTHETIST, CERTIFIED REGISTERED

## 2023-03-21 PROCEDURE — 25010000002 DEXAMETHASONE PER 1 MG: Performed by: NURSE ANESTHETIST, CERTIFIED REGISTERED

## 2023-03-21 PROCEDURE — 25010000002 CEFAZOLIN PER 500 MG: Performed by: PODIATRIST

## 2023-03-21 DEVICE — 3.5 X 12 MM R3CON LOCKING PLATE SCREW
Type: IMPLANTABLE DEVICE | Site: ANKLE | Status: FUNCTIONAL
Brand: GORILLA PLATING SYSTEM

## 2023-03-21 DEVICE — K-WIRE, SINGLE ENDED TROCAR TIP, SMOOTH, 1.2 X 150MM
Type: IMPLANTABLE DEVICE | Site: ANKLE | Status: FUNCTIONAL
Brand: MONSTER SCREW SYSTEM

## 2023-03-21 DEVICE — PUTTY BONE W/CORT FIBR 5CC: Type: IMPLANTABLE DEVICE | Site: ANKLE | Status: FUNCTIONAL

## 2023-03-21 DEVICE — 3.5 X 38 MM R3CON LOCKING PLATE SCREW
Type: IMPLANTABLE DEVICE | Site: ANKLE | Status: FUNCTIONAL
Brand: GORILLA PLATING SYSTEM

## 2023-03-21 DEVICE — 3.5 X 20 MM R3CON NON-LOCKING PLATE SCREW
Type: IMPLANTABLE DEVICE | Site: ANKLE | Status: FUNCTIONAL
Brand: GORILLA PLATING SYSTEM

## 2023-03-21 DEVICE — OLIVE WIRE, SMOOTH, 1.4MM
Type: IMPLANTABLE DEVICE | Site: ANKLE | Status: FUNCTIONAL
Brand: BABY GORILLA/GORILLA PLATING SYSTEM

## 2023-03-21 DEVICE — 2.7 X 14 MM R3CON LOCKING PLATE SCREW
Type: IMPLANTABLE DEVICE | Site: ANKLE | Status: FUNCTIONAL
Brand: GORILLA PLATING SYSTEM

## 2023-03-21 DEVICE — MINI MONSTER 4.0 X 40MM HEADED SCREW, SHORT THREAD
Type: IMPLANTABLE DEVICE | Site: ANKLE | Status: FUNCTIONAL
Brand: MONSTER SCREW SYSTEM

## 2023-03-21 DEVICE — 3.5 X 40 MM R3CON LOCKING PLATE SCREW
Type: IMPLANTABLE DEVICE | Site: ANKLE | Status: FUNCTIONAL
Brand: GORILLA PLATING SYSTEM

## 2023-03-21 DEVICE — K-WIRE, SINGLE ENDED TROCAR TIP, SMOOTH, 2.3 X 150MM
Type: IMPLANTABLE DEVICE | Site: ANKLE | Status: FUNCTIONAL
Brand: MULTI SYSTEM

## 2023-03-21 DEVICE — 3.5 X 14 MM R3CON LOCKING PLATE SCREW
Type: IMPLANTABLE DEVICE | Site: ANKLE | Status: FUNCTIONAL
Brand: GORILLA PLATING SYSTEM

## 2023-03-21 DEVICE — 4.2 X 38 MM R3CON LOCKING PLATE SCREW
Type: IMPLANTABLE DEVICE | Site: ANKLE | Status: FUNCTIONAL
Brand: GORILLA PLATING SYSTEM

## 2023-03-21 DEVICE — MINI MONSTER 4.0 X 24MM HEADED SCREW, SHORT THREAD
Type: IMPLANTABLE DEVICE | Site: ANKLE | Status: FUNCTIONAL
Brand: MONSTER SCREW SYSTEM

## 2023-03-21 DEVICE — 3.5 X 26 MM R3CON LOCKING PLATE SCREW
Type: IMPLANTABLE DEVICE | Site: ANKLE | Status: FUNCTIONAL
Brand: GORILLA PLATING SYSTEM

## 2023-03-21 DEVICE — 3.5 X 16 MM R3CON LOCKING PLATE SCREW
Type: IMPLANTABLE DEVICE | Site: ANKLE | Status: FUNCTIONAL
Brand: GORILLA PLATING SYSTEM

## 2023-03-21 DEVICE — MINI MONSTER 4.0 X 36MM HEADED SCREW, SHORT THREAD
Type: IMPLANTABLE DEVICE | Site: ANKLE | Status: FUNCTIONAL
Brand: MONSTER SCREW SYSTEM

## 2023-03-21 DEVICE — GORILLA, ANKLE FX, FIBULAR, PLATE, STRAIGHT, 09-HOLE
Type: IMPLANTABLE DEVICE | Site: ANKLE | Status: FUNCTIONAL
Brand: GORILLA PLATING SYSTEM

## 2023-03-21 DEVICE — GORILLA, ANKLE FX., ANTEROLATERAL DISTAL TIBIA PLATE, LEFT, 11-HOLES, TIA
Type: IMPLANTABLE DEVICE | Site: ANKLE | Status: FUNCTIONAL
Brand: BABY GORILLA®/GORILLA® PLATING SYSTEM

## 2023-03-21 DEVICE — 3.5 X 30 MM R3CON LOCKING PLATE SCREW
Type: IMPLANTABLE DEVICE | Site: ANKLE | Status: FUNCTIONAL
Brand: GORILLA PLATING SYSTEM

## 2023-03-21 DEVICE — 2.7 X 16 MM R3CON LOCKING PLATE SCREW
Type: IMPLANTABLE DEVICE | Site: ANKLE | Status: FUNCTIONAL
Brand: GORILLA PLATING SYSTEM

## 2023-03-21 DEVICE — 3.5 X 18 MM R3CON LOCKING PLATE SCREW
Type: IMPLANTABLE DEVICE | Site: ANKLE | Status: FUNCTIONAL
Brand: GORILLA PLATING SYSTEM

## 2023-03-21 DEVICE — 3.5 X 36 MM R3CON LOCKING PLATE SCREW
Type: IMPLANTABLE DEVICE | Site: ANKLE | Status: FUNCTIONAL
Brand: GORILLA PLATING SYSTEM

## 2023-03-21 DEVICE — 2.7 X 11 MM R3CON LOCKING PLATE SCREW
Type: IMPLANTABLE DEVICE | Site: ANKLE | Status: FUNCTIONAL
Brand: GORILLA PLATING SYSTEM

## 2023-03-21 DEVICE — 3.5 X 10 MM R3CON LOCKING PLATE SCREW
Type: IMPLANTABLE DEVICE | Site: ANKLE | Status: FUNCTIONAL
Brand: GORILLA PLATING SYSTEM

## 2023-03-21 RX ORDER — FENTANYL CITRATE 50 UG/ML
50 INJECTION, SOLUTION INTRAMUSCULAR; INTRAVENOUS ONCE
Status: COMPLETED | OUTPATIENT
Start: 2023-03-21 | End: 2023-03-21

## 2023-03-21 RX ORDER — IBUPROFEN 600 MG/1
600 TABLET ORAL ONCE AS NEEDED
Status: DISCONTINUED | OUTPATIENT
Start: 2023-03-21 | End: 2023-03-21 | Stop reason: HOSPADM

## 2023-03-21 RX ORDER — SODIUM CHLORIDE, SODIUM LACTATE, POTASSIUM CHLORIDE, CALCIUM CHLORIDE 600; 310; 30; 20 MG/100ML; MG/100ML; MG/100ML; MG/100ML
1000 INJECTION, SOLUTION INTRAVENOUS CONTINUOUS
Status: DISCONTINUED | OUTPATIENT
Start: 2023-03-21 | End: 2023-03-21 | Stop reason: HOSPADM

## 2023-03-21 RX ORDER — SODIUM CHLORIDE 0.9 % (FLUSH) 0.9 %
3 SYRINGE (ML) INJECTION EVERY 12 HOURS SCHEDULED
Status: DISCONTINUED | OUTPATIENT
Start: 2023-03-21 | End: 2023-03-21 | Stop reason: HOSPADM

## 2023-03-21 RX ORDER — ACETAMINOPHEN 500 MG
1000 TABLET ORAL ONCE
Status: COMPLETED | OUTPATIENT
Start: 2023-03-21 | End: 2023-03-21

## 2023-03-21 RX ORDER — DROPERIDOL 2.5 MG/ML
0.62 INJECTION, SOLUTION INTRAMUSCULAR; INTRAVENOUS ONCE AS NEEDED
Status: DISCONTINUED | OUTPATIENT
Start: 2023-03-21 | End: 2023-03-21 | Stop reason: HOSPADM

## 2023-03-21 RX ORDER — ROCURONIUM BROMIDE 10 MG/ML
INJECTION, SOLUTION INTRAVENOUS AS NEEDED
Status: DISCONTINUED | OUTPATIENT
Start: 2023-03-21 | End: 2023-03-21 | Stop reason: SURG

## 2023-03-21 RX ORDER — ONDANSETRON 2 MG/ML
4 INJECTION INTRAMUSCULAR; INTRAVENOUS
Status: DISCONTINUED | OUTPATIENT
Start: 2023-03-21 | End: 2023-03-21 | Stop reason: HOSPADM

## 2023-03-21 RX ORDER — LIDOCAINE HYDROCHLORIDE 10 MG/ML
0.5 INJECTION, SOLUTION EPIDURAL; INFILTRATION; INTRACAUDAL; PERINEURAL ONCE AS NEEDED
Status: DISCONTINUED | OUTPATIENT
Start: 2023-03-21 | End: 2023-03-21 | Stop reason: HOSPADM

## 2023-03-21 RX ORDER — SODIUM CHLORIDE 0.9 % (FLUSH) 0.9 %
3-10 SYRINGE (ML) INJECTION AS NEEDED
Status: DISCONTINUED | OUTPATIENT
Start: 2023-03-21 | End: 2023-03-21 | Stop reason: HOSPADM

## 2023-03-21 RX ORDER — PROPOFOL 10 MG/ML
VIAL (ML) INTRAVENOUS AS NEEDED
Status: DISCONTINUED | OUTPATIENT
Start: 2023-03-21 | End: 2023-03-21 | Stop reason: SURG

## 2023-03-21 RX ORDER — ONDANSETRON 2 MG/ML
INJECTION INTRAMUSCULAR; INTRAVENOUS AS NEEDED
Status: DISCONTINUED | OUTPATIENT
Start: 2023-03-21 | End: 2023-03-21 | Stop reason: SURG

## 2023-03-21 RX ORDER — FENTANYL CITRATE 50 UG/ML
25 INJECTION, SOLUTION INTRAMUSCULAR; INTRAVENOUS
Status: DISCONTINUED | OUTPATIENT
Start: 2023-03-21 | End: 2023-03-21 | Stop reason: HOSPADM

## 2023-03-21 RX ORDER — HYDROMORPHONE HYDROCHLORIDE 1 MG/ML
0.5 INJECTION, SOLUTION INTRAMUSCULAR; INTRAVENOUS; SUBCUTANEOUS
Status: DISCONTINUED | OUTPATIENT
Start: 2023-03-21 | End: 2023-03-21 | Stop reason: HOSPADM

## 2023-03-21 RX ORDER — FLUMAZENIL 0.1 MG/ML
0.2 INJECTION INTRAVENOUS AS NEEDED
Status: DISCONTINUED | OUTPATIENT
Start: 2023-03-21 | End: 2023-03-21 | Stop reason: HOSPADM

## 2023-03-21 RX ORDER — SODIUM CHLORIDE, SODIUM LACTATE, POTASSIUM CHLORIDE, CALCIUM CHLORIDE 600; 310; 30; 20 MG/100ML; MG/100ML; MG/100ML; MG/100ML
100 INJECTION, SOLUTION INTRAVENOUS CONTINUOUS PRN
Status: DISCONTINUED | OUTPATIENT
Start: 2023-03-21 | End: 2023-03-21 | Stop reason: HOSPADM

## 2023-03-21 RX ORDER — ONDANSETRON 4 MG/1
4 TABLET, FILM COATED ORAL EVERY 4 HOURS
Qty: 30 TABLET | Refills: 0 | Status: SHIPPED | OUTPATIENT
Start: 2023-03-21

## 2023-03-21 RX ORDER — OXYCODONE AND ACETAMINOPHEN 10; 325 MG/1; MG/1
1 TABLET ORAL ONCE AS NEEDED
Status: DISCONTINUED | OUTPATIENT
Start: 2023-03-21 | End: 2023-03-21 | Stop reason: HOSPADM

## 2023-03-21 RX ORDER — SODIUM CHLORIDE 0.9 % (FLUSH) 0.9 %
10 SYRINGE (ML) INJECTION AS NEEDED
Status: DISCONTINUED | OUTPATIENT
Start: 2023-03-21 | End: 2023-03-21 | Stop reason: HOSPADM

## 2023-03-21 RX ORDER — FENTANYL CITRATE 50 UG/ML
INJECTION, SOLUTION INTRAMUSCULAR; INTRAVENOUS AS NEEDED
Status: DISCONTINUED | OUTPATIENT
Start: 2023-03-21 | End: 2023-03-21 | Stop reason: SURG

## 2023-03-21 RX ORDER — SODIUM CHLORIDE 9 MG/ML
40 INJECTION, SOLUTION INTRAVENOUS AS NEEDED
Status: DISCONTINUED | OUTPATIENT
Start: 2023-03-21 | End: 2023-03-21 | Stop reason: HOSPADM

## 2023-03-21 RX ORDER — SODIUM CHLORIDE 0.9 % (FLUSH) 0.9 %
3 SYRINGE (ML) INJECTION AS NEEDED
Status: DISCONTINUED | OUTPATIENT
Start: 2023-03-21 | End: 2023-03-21 | Stop reason: HOSPADM

## 2023-03-21 RX ORDER — ENOXAPARIN SODIUM 100 MG/ML
40 INJECTION SUBCUTANEOUS DAILY
Qty: 12.4 ML | Refills: 0 | Status: SHIPPED | OUTPATIENT
Start: 2023-03-21 | End: 2023-04-20

## 2023-03-21 RX ORDER — SUCCINYLCHOLINE/SOD CL,ISO/PF 200MG/10ML
SYRINGE (ML) INTRAVENOUS AS NEEDED
Status: DISCONTINUED | OUTPATIENT
Start: 2023-03-21 | End: 2023-03-21 | Stop reason: SURG

## 2023-03-21 RX ORDER — MAGNESIUM HYDROXIDE 1200 MG/15ML
LIQUID ORAL AS NEEDED
Status: DISCONTINUED | OUTPATIENT
Start: 2023-03-21 | End: 2023-03-21 | Stop reason: HOSPADM

## 2023-03-21 RX ORDER — OXYCODONE AND ACETAMINOPHEN 10; 325 MG/1; MG/1
1 TABLET ORAL EVERY 4 HOURS PRN
Qty: 30 TABLET | Refills: 0 | Status: SHIPPED | OUTPATIENT
Start: 2023-03-21

## 2023-03-21 RX ORDER — LIDOCAINE HYDROCHLORIDE 20 MG/ML
INJECTION, SOLUTION EPIDURAL; INFILTRATION; INTRACAUDAL; PERINEURAL AS NEEDED
Status: DISCONTINUED | OUTPATIENT
Start: 2023-03-21 | End: 2023-03-21 | Stop reason: SURG

## 2023-03-21 RX ORDER — ROPIVACAINE HYDROCHLORIDE 5 MG/ML
INJECTION, SOLUTION EPIDURAL; INFILTRATION; PERINEURAL
Status: COMPLETED | OUTPATIENT
Start: 2023-03-21 | End: 2023-03-21

## 2023-03-21 RX ORDER — NALOXONE HYDROCHLORIDE 4 MG/.1ML
1 SPRAY NASAL AS NEEDED
Qty: 1 EACH | Refills: 0 | Status: SHIPPED | OUTPATIENT
Start: 2023-03-21

## 2023-03-21 RX ORDER — HYDROCODONE BITARTRATE AND ACETAMINOPHEN 7.5; 325 MG/1; MG/1
1 TABLET ORAL EVERY 6 HOURS PRN
COMMUNITY
Start: 2023-03-15 | End: 2023-03-21 | Stop reason: HOSPADM

## 2023-03-21 RX ORDER — MIDAZOLAM HYDROCHLORIDE 1 MG/ML
1 INJECTION INTRAMUSCULAR; INTRAVENOUS
Status: DISCONTINUED | OUTPATIENT
Start: 2023-03-21 | End: 2023-03-21 | Stop reason: HOSPADM

## 2023-03-21 RX ORDER — LABETALOL HYDROCHLORIDE 5 MG/ML
5 INJECTION, SOLUTION INTRAVENOUS
Status: DISCONTINUED | OUTPATIENT
Start: 2023-03-21 | End: 2023-03-21 | Stop reason: HOSPADM

## 2023-03-21 RX ORDER — MIDAZOLAM HYDROCHLORIDE 1 MG/ML
2 INJECTION INTRAMUSCULAR; INTRAVENOUS ONCE
Status: COMPLETED | OUTPATIENT
Start: 2023-03-21 | End: 2023-03-21

## 2023-03-21 RX ORDER — SODIUM CHLORIDE 0.9 % (FLUSH) 0.9 %
10 SYRINGE (ML) INJECTION EVERY 12 HOURS SCHEDULED
Status: DISCONTINUED | OUTPATIENT
Start: 2023-03-21 | End: 2023-03-21 | Stop reason: HOSPADM

## 2023-03-21 RX ORDER — DEXAMETHASONE SODIUM PHOSPHATE 4 MG/ML
INJECTION, SOLUTION INTRA-ARTICULAR; INTRALESIONAL; INTRAMUSCULAR; INTRAVENOUS; SOFT TISSUE AS NEEDED
Status: DISCONTINUED | OUTPATIENT
Start: 2023-03-21 | End: 2023-03-21 | Stop reason: SURG

## 2023-03-21 RX ORDER — DOCUSATE SODIUM 100 MG/1
100 CAPSULE, LIQUID FILLED ORAL 2 TIMES DAILY PRN
Qty: 60 CAPSULE | Refills: 0 | Status: SHIPPED | OUTPATIENT
Start: 2023-03-21

## 2023-03-21 RX ORDER — EPHEDRINE SULFATE 50 MG/ML
INJECTION, SOLUTION INTRAVENOUS AS NEEDED
Status: DISCONTINUED | OUTPATIENT
Start: 2023-03-21 | End: 2023-03-21 | Stop reason: SURG

## 2023-03-21 RX ORDER — NALOXONE HCL 0.4 MG/ML
0.04 VIAL (ML) INJECTION AS NEEDED
Status: DISCONTINUED | OUTPATIENT
Start: 2023-03-21 | End: 2023-03-21 | Stop reason: HOSPADM

## 2023-03-21 RX ORDER — MIDAZOLAM HYDROCHLORIDE 1 MG/ML
0.5 INJECTION INTRAMUSCULAR; INTRAVENOUS
Status: DISCONTINUED | OUTPATIENT
Start: 2023-03-21 | End: 2023-03-21 | Stop reason: HOSPADM

## 2023-03-21 RX ORDER — SODIUM CHLORIDE, SODIUM LACTATE, POTASSIUM CHLORIDE, CALCIUM CHLORIDE 600; 310; 30; 20 MG/100ML; MG/100ML; MG/100ML; MG/100ML
100 INJECTION, SOLUTION INTRAVENOUS CONTINUOUS
Status: DISCONTINUED | OUTPATIENT
Start: 2023-03-21 | End: 2023-03-21 | Stop reason: HOSPADM

## 2023-03-21 RX ADMIN — FENTANYL CITRATE 50 MCG: 50 INJECTION, SOLUTION INTRAMUSCULAR; INTRAVENOUS at 13:23

## 2023-03-21 RX ADMIN — MIDAZOLAM HYDROCHLORIDE 2 MG: 2 INJECTION, SOLUTION INTRAMUSCULAR; INTRAVENOUS at 13:22

## 2023-03-21 RX ADMIN — FENTANYL CITRATE 50 MCG: 50 INJECTION INTRAMUSCULAR; INTRAVENOUS at 16:05

## 2023-03-21 RX ADMIN — PROPOFOL INJECTABLE EMULSION 140 MG: 10 INJECTION, EMULSION INTRAVENOUS at 13:58

## 2023-03-21 RX ADMIN — ROCURONIUM BROMIDE 20 MG: 10 INJECTION, SOLUTION INTRAVENOUS at 14:22

## 2023-03-21 RX ADMIN — ROCURONIUM BROMIDE 5 MG: 10 INJECTION, SOLUTION INTRAVENOUS at 13:58

## 2023-03-21 RX ADMIN — FENTANYL CITRATE 50 MCG: 50 INJECTION INTRAMUSCULAR; INTRAVENOUS at 14:04

## 2023-03-21 RX ADMIN — DEXAMETHASONE SODIUM PHOSPHATE 4 MG: 4 INJECTION, SOLUTION INTRA-ARTICULAR; INTRALESIONAL; INTRAMUSCULAR; INTRAVENOUS; SOFT TISSUE at 14:48

## 2023-03-21 RX ADMIN — EPHEDRINE SULFATE 10 MG: 50 INJECTION INTRAVENOUS at 14:18

## 2023-03-21 RX ADMIN — ACETAMINOPHEN 1000 MG: 500 TABLET, FILM COATED ORAL at 13:22

## 2023-03-21 RX ADMIN — ROPIVACAINE HYDROCHLORIDE 20 ML: 5 INJECTION, SOLUTION EPIDURAL; INFILTRATION; PERINEURAL at 13:28

## 2023-03-21 RX ADMIN — EPHEDRINE SULFATE 10 MG: 50 INJECTION INTRAVENOUS at 14:39

## 2023-03-21 RX ADMIN — Medication 160 MG: at 13:58

## 2023-03-21 RX ADMIN — ONDANSETRON 4 MG: 2 INJECTION INTRAMUSCULAR; INTRAVENOUS at 16:05

## 2023-03-21 RX ADMIN — ROPIVACAINE HYDROCHLORIDE 20 ML: 5 INJECTION, SOLUTION EPIDURAL; INFILTRATION; PERINEURAL at 13:25

## 2023-03-21 RX ADMIN — EPHEDRINE SULFATE 10 MG: 50 INJECTION INTRAVENOUS at 15:16

## 2023-03-21 RX ADMIN — LIDOCAINE HYDROCHLORIDE 60 MG: 20 INJECTION, SOLUTION EPIDURAL; INFILTRATION; INTRACAUDAL at 13:58

## 2023-03-21 RX ADMIN — SODIUM CHLORIDE, POTASSIUM CHLORIDE, SODIUM LACTATE AND CALCIUM CHLORIDE 1000 ML: 600; 310; 30; 20 INJECTION, SOLUTION INTRAVENOUS at 11:59

## 2023-03-21 NOTE — ANESTHESIA PREPROCEDURE EVALUATION
Anesthesia Evaluation     Patient summary reviewed   no history of anesthetic complications:  NPO Solid Status: > 8 hours  NPO Liquid Status: > 8 hours           Airway   Mallampati: I  TM distance: >3 FB  Neck ROM: full  Dental    (+) edentulous    Pulmonary    (+) a smoker Current,   Cardiovascular - negative cardio ROS        Neuro/Psych- negative ROS  GI/Hepatic/Renal/Endo - negative ROS     Musculoskeletal (-) negative ROS    Abdominal    Substance History      OB/GYN          Other                          Anesthesia Plan    ASA 2     general with block     intravenous induction     Anesthetic plan, risks, benefits, and alternatives have been provided, discussed and informed consent has been obtained with: patient.        CODE STATUS:

## 2023-03-21 NOTE — ANESTHESIA PROCEDURE NOTES
Airway  Urgency: elective    Date/Time: 3/21/2023 1:59 PM  Airway not difficult    General Information and Staff    Patient location during procedure: OR  CRNA/CAA: Darrel Dugan CRNA    Indications and Patient Condition  Indications for airway management: airway protection    Preoxygenated: yes  Mask difficulty assessment: 1 - vent by mask    Final Airway Details  Final airway type: endotracheal airway      Successful airway: ETT  Cuffed: yes   Successful intubation technique: direct laryngoscopy  Endotracheal tube insertion site: oral  Blade: Kelsy  Blade size: 3.5  ETT size (mm): 7.5  Cormack-Lehane Classification: grade I - full view of glottis  Placement verified by: chest auscultation and capnometry   Cuff volume (mL): 6  Measured from: lips  ETT/EBT  to lips (cm): 20  Number of attempts at approach: 1  Assessment: lips, teeth, and gum same as pre-op and atraumatic intubation

## 2023-03-21 NOTE — OP NOTE
ANKLE OPEN REDUCTION INTERNAL FIXATION  Procedure Note    Nicci Johns  3/21/2023    Pre-op Diagnosis:   External fixator, distal tibia and fibular fracture.    Post-op Diagnosis:     Post-Op Diagnosis Codes:     * Closed fracture of left distal tibia [S82.302A]     * Closed fracture of distal end of fibula [S82.839A]     * Aftercare involving removal of external fixation device [Z47.89]     Procedure/CPT Codes:       Procedure(s):  1) REMOVAL OF EXTERNAL FIXATION UNDER ANESTHESIA  2) OPEN REDUCTION AND INTERNAL FIXATION OF LEFT ANKLE, intra-articular DISTAL TIBIA AND FIBULA    Surgeon(s):  Dean Chandler DPM    Anesthesia: General with Block    Staff:   Circulator: Emiliano Rausch RN; Bonny Loyola RN  Scrub Person: Shaji Mendez; Medardo Farmer        Indications for procedure:  Closed displaced distal tibia and fibula fracture.    Procedure details:  The patient brought the operating room placed under general anesthesia.  She did have a preoperative regional block per anesthesia preoperatively area.    Procedure #1 removal of external fixation device left ankle    Attention was then directed the patient's left ankle.  The carbon fiber rods were loosened with the hex knots.  All pin sites were prepped with Betadine.  The pins at the calcaneus was cut close to the skin and then removed.  The 2 pin sites and the tibia were removed.  There was prepped with Betadine.  A curette was used to remove any nonviable tissue and all pin site areas were closed with 3-0 nylon.    Left leg prepped and draped in usual sterile fashion.    Procedure #2 open reduction internal fixation intra-articular distal tibia and fibula fracture left    Attention was then directed lateral ankle where an approximate 20 cm linear incision was made.  Deepened with sharp and blunt dissection technique.  Linear periosteal incision was made and the periosteum was elevated.  The fibula was significantly comminuted.  It was brought  out to length and a plate was placed laterally.  X-ray exam was performed.  3 locking screws were placed proximally 4 locking screws were placed distally.  Bone graft was placed at the comminution fracture of the fibula.  The periosteal tissues were repaired with 0 Vicryl.  Closure performed in layers.    Attention was then directed anteriorly.  A 20 cm incision was created anterior the ankle.  A linear it was deep with sharp and blunt dissection technique.  Extensor retinaculum was incised.  Balas anterior retracted medially extensor tendons and neurovascular bundle retracted laterally.  A linear periosteal and capsular incision was made and the fracture was identified.  The fracture was opened and debrided.  The joint was debrided and suctioned free of any hematoma.  The fracture was then distracted and reduced.  The joint surface was aligned.  There was some central cartilage loss due to combination.  Wires were placed from anterior to posterior.  A Hawthorne 28 plate was then placed anterior laterally.  It was temporally fixated.  2 screws were placed crossing the fracture site that were cannulated screws.  Locking screws were then placed sequentially in the plate.  There was some lack of cartilage centrally due to the combination.  Wound was irrigated.  X-ray exam was performed.  Closure performed in layers.  Well-padded compression bandage with posterior splint was applied.    Estimated Blood Loss: none    Specimens:                None      Drains: * No LDAs found *    Implants:   Implant Name Type Inv. Item Serial No.  Lot No. LRB No. Used Action   PUTTY BONE W/NELSON FIBR 5CC - [id number] Implant PUTTY BONE W/NELSON FIBR 5CC  [id number] Left 1 Implanted   PLT FIB GORILLA STR 9H - [id number] Implant PLT FIB GORILLA STR 9H  [employer] 28  Left 1 Implanted   KWIRE SMOTH SGL/TROC 2.2P704TP NS - [id number] Implant KWIRE SMOTH SGL/TROC [id number] NS  [employer]ON 28  Left 1 Implanted   KWIRE SMOTH  SGL/TROC 1.9L901WV NS - [id number] Implant KWIRE SMOTH SGL/TROC [id number] NS  [employer] 28  Left 6 Implanted   WR OLIVE SMOTH 1.4MM - [id number] Implant WR OLIVE SMOTH 1.4MM  [employer] 28  Left 4 Implanted   plate    [employer]ON 28  Left 1 Implanted   SCRW PLT R3CON LK 2.7X14MM - [id number] Implant SCRW PLT R3CON LK 2.7X14MM  [employer] 28  Left 2 Implanted   SCRW PLT R3CON LK 2.7X11MM - [id number] Implant SCRW PLT R3CON LK 2.7X11MM  [employer] 28  Left 1 Implanted   SCRW PLT R3CON LK 2.7X16MM - [id number] Implant SCRW PLT R3CON LK 2.7X16MM  [employer] 28  Left 1 Implanted   SCRW GORILLA R3CON LK 4.2X38 - [id number] Implant SCRW GORILLA R3CON LK 4.2X38  [employer] 28  Left 1 Implanted   4.0x40 screw    [employer]ON 28  Left 1 Implanted   4.0x36 screw      Left 1 Implanted   SCRW PLT R3CON LK 3.5X12MM - [id number] Implant SCRW PLT R3CON LK 3.5X12MM  [employer] 28  Left 1 Implanted   SCRW PLT R3CON LK 3.5X10MM - [id number] Implant SCRW PLT R3CON LK 3.5X10MM  [employer] 28  Left 1 Implanted   SCRW PLT R3CON LK 3.5X14MM - [id number] Implant SCRW PLT R3CON LK 3.5X14MM  [employer] 28  Left 1 Implanted   SCRW PLT R3CON LK 3.5X16MM - [id number] Implant SCRW PLT R3CON LK 3.5X16MM  [employer]ON 28  Left 1 Implanted   SCRW GORILLA R3CON LK 3.5X36MM - [id number] Implant SCRW GORILLA R3CON LK 3.5X36MM  [employer] 28  Left 1 Implanted   SCRW PLT R3CON LK 3.5X40MM - [id number] Implant SCRW PLT R3CON LK 3.5X40MM  [employer]ON 28  Left 1 Implanted   SCRW PLT R3CON LK 3.5X38MM - [id number] Implant SCRW PLT R3CON LK 3.5X38MM  [employer]ON 28  Left 2 Implanted   SCRW PLT R3CON LK 3.5X26MM - [id number] Implant SCRW PLT R3CON LK 3.5X26MM  [employer]ON 28  Left 1 Implanted   SCRW PLT R3CON LK 3.5X18MM - [id number] Implant SCRW PLT R3CON LK 3.5X18MM  [employer]ON 28  Left 1 Implanted   SCRW PLT R3CON LK 3.5X30MM - [id number] Implant SCRW PLT R3CON LK 3.5X30MM  [employer]ON 28  Left 1 Implanted   SCRW PLT R3CON NL 3.5X20MM - [id number] Implant SCRW PLT R3CON NL 3.5X20MM  PAR[employer]ON 28  Left 1 Implanted    4.0x24 screw    PARAGON 28  Left 1 Implanted        Complications: none           Follow up:   Nonweightbearing.  3 to 5 days for follow-up.  Prescriptions for Percocet Zofran and Lovenox were given.    Dean Chandler DPM     Date: 3/21/2023  Time: 16:47 CDT

## 2023-03-21 NOTE — ANESTHESIA PROCEDURE NOTES
Peripheral Block    Pre-sedation assessment completed: 3/21/2023 1:24 PM    Patient reassessed immediately prior to procedure    Patient location during procedure: holding area  Start time: 3/21/2023 1:25 PM  Stop time: 3/21/2023 1:26 PM  Reason for block: procedure for pain, at surgeon's request, post-op pain management and Requested by Dr. Chandler  Performed by  Anesthesiologist: Joseph Cameron MD  Preanesthetic Checklist  Completed: patient identified, IV checked, site marked, risks and benefits discussed, surgical consent, monitors and equipment checked, pre-op evaluation and timeout performed  Prep:  Pt Position: supine  Sterile barriers:mask, gloves, cap and washed/disinfected hands  Prep: ChloraPrep  Patient monitoring: blood pressure monitoring, continuous pulse oximetry and EKG  Procedure    Sedation: yes  Performed under: MAC  Guidance:ultrasound guided and Sciatic nerve identified and local anesthetic seen surrounding nerve    ULTRASOUND INTERPRETATION.  Using ultrasound guidance a 20 G gauge needle was placed in close proximity to the sciatic nerve, at which point, under ultrasound guidance anesthetic was injected in the area of the nerve and spread of the anesthesia was seen on ultrasound in close proximity thereto.  There were no abnormalities seen on ultrasound; a digital image was taken; and the patient tolerated the procedure with no complications. Images:still images obtained (picture printed and placed in patients chart)    Laterality:left  Block Type:sciatic and popliteal  Injection Technique:single-shot  Needle Type:echogenic  Needle Gauge:20 G  Resistance on Injection: none    Medications Used: ropivacaine (NAROPIN) injection 0.5 % - Injection   20 mL - 3/21/2023 1:25:00 PM      Post Assessment  Injection Assessment: negative aspiration for heme, no paresthesia on injection and incremental injection  Patient Tolerance:comfortable throughout block  Complications:no
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
## 2023-03-21 NOTE — ANESTHESIA PROCEDURE NOTES
Peripheral Block    Pre-sedation assessment completed: 3/21/2023 1:24 PM    Patient reassessed immediately prior to procedure    Patient location during procedure: holding area  Start time: 3/21/2023 1:28 PM  Stop time: 3/21/2023 1:29 PM  Reason for block: procedure for pain, at surgeon's request, post-op pain management and Requested by Dr. Chandler  Performed by  Anesthesiologist: Joseph Cameron MD  Preanesthetic Checklist  Completed: patient identified, IV checked, site marked, risks and benefits discussed, surgical consent, monitors and equipment checked, pre-op evaluation and timeout performed  Prep:  Pt Position: supine  Sterile barriers:mask, gloves and washed/disinfected hands  Prep: ChloraPrep  Patient monitoring: blood pressure monitoring, continuous pulse oximetry and EKG  Procedure    Sedation: yes  Performed under: MAC  Guidance:ultrasound guided and femoral artery identified in adductor canal and local anesthetic seen surrounding artery    ULTRASOUND INTERPRETATION.  Using ultrasound guidance a 20 G gauge needle was placed in close proximity to the femoral nerve, at which point, under ultrasound guidance anesthetic was injected in the area of the nerve and spread of the anesthesia was seen on ultrasound in close proximity thereto.  There were no abnormalities seen on ultrasound; a digital image was taken; and the patient tolerated the procedure with no complications. Images:still images obtained (picture printed and placed in patients chart)    Laterality:left  Block Type:adductor canal block  Injection Technique:single-shot  Needle Type:echogenic  Needle Gauge:20 G  Resistance on Injection: none    Medications Used: ropivacaine (NAROPIN) injection 0.5 % - Injection   20 mL - 3/21/2023 1:28:00 PM      Post Assessment  Injection Assessment: negative aspiration for heme, no paresthesia on injection and incremental injection  Patient Tolerance:comfortable throughout block  Complications:no

## 2023-03-22 NOTE — ANESTHESIA POSTPROCEDURE EVALUATION
"Patient: Nicci Johns    Procedure Summary     Date: 03/21/23 Room / Location:  [facility]  /  [facility]    Anesthesia Start: 1352 Anesthesia Stop: 1647    Procedure: REMOVAL OF EXTERNAL FIXATION UNDER ANESTHESIA, OPEN REDUCTION AND INTERNAL FIXATION OF LEFT ANKLE, DISTAL TIBIA AND FIBULA (Left: Ankle) Diagnosis:       Closed fracture of left distal tibia      Closed fracture of distal end of fibula      Aftercare involving removal of external fixation device      (External fixator, distal tibia and fibular fracture.)    Surgeons: Dean Chandler DPM Provider: Darrel Dugan CRNA    Anesthesia Type: general with block ASA Status: 2          Anesthesia Type: general with block    Vitals  Vitals Value Taken Time   /59 03/21/23 1709   Temp 97.9 °F (36.6 °C) 03/21/23 1705   Pulse 97 03/21/23 1709   Resp 14 03/21/23 1705   SpO2 94 % 03/21/23 1709   Vitals shown include unvalidated device data.        Post Anesthesia Care and Evaluation    PONV Status: none  Comments: Patient d/c from PACU prior to anes eval based on Jose score.  Please see RN notes for details of d/c criteria.    Blood pressure 127/65, pulse 75, temperature 97.9 °F (36.6 °C), temperature source Temporal, resp. rate 16, height 165 cm (64.96\"), weight 61.6 kg (135 lb 12.9 oz), SpO2 98 %.          "

## 2025-02-22 ENCOUNTER — HOSPITAL ENCOUNTER (INPATIENT)
Age: 70
LOS: 3 days | Discharge: HOME | DRG: 640 | End: 2025-02-25
Attending: STUDENT IN AN ORGANIZED HEALTH CARE EDUCATION/TRAINING PROGRAM | Admitting: INTERNAL MEDICINE
Payer: MEDICARE

## 2025-02-22 ENCOUNTER — APPOINTMENT (OUTPATIENT)
Dept: GENERAL RADIOLOGY | Age: 70
DRG: 640 | End: 2025-02-22
Payer: MEDICARE

## 2025-02-22 DIAGNOSIS — E87.1 HYPONATREMIA: Primary | ICD-10-CM

## 2025-02-22 DIAGNOSIS — R53.1 GENERALIZED WEAKNESS: ICD-10-CM

## 2025-02-22 PROBLEM — E78.5 HYPERLIPIDEMIA: Status: ACTIVE | Noted: 2025-02-22

## 2025-02-22 PROBLEM — J10.1 INFLUENZA A: Status: ACTIVE | Noted: 2025-02-22

## 2025-02-22 LAB
ALBUMIN SERPL-MCNC: 5.5 G/DL (ref 3.5–5.2)
ALP SERPL-CCNC: 81 U/L (ref 35–104)
ALT SERPL-CCNC: 22 U/L (ref 5–33)
ANION GAP SERPL CALCULATED.3IONS-SCNC: 25 MMOL/L (ref 8–16)
AST SERPL-CCNC: 39 U/L (ref 5–32)
B PARAP IS1001 DNA NPH QL NAA+NON-PROBE: NOT DETECTED
B PERT.PT PRMT NPH QL NAA+NON-PROBE: NOT DETECTED
BACTERIA URNS QL MICRO: NEGATIVE /HPF
BASOPHILS # BLD: 0 K/UL (ref 0–0.2)
BASOPHILS NFR BLD: 0.3 % (ref 0–1)
BILIRUB SERPL-MCNC: 1.3 MG/DL (ref 0.2–1.2)
BILIRUB UR QL STRIP: NEGATIVE
BUN SERPL-MCNC: 28 MG/DL (ref 8–23)
C PNEUM DNA NPH QL NAA+NON-PROBE: NOT DETECTED
CALCIUM SERPL-MCNC: 11.4 MG/DL (ref 8.8–10.2)
CHLORIDE SERPL-SCNC: 76 MMOL/L (ref 98–107)
CK SERPL-CCNC: 191 U/L (ref 26–192)
CLARITY UR: CLEAR
CO2 SERPL-SCNC: 22 MMOL/L (ref 22–29)
COLOR UR: YELLOW
CREAT SERPL-MCNC: 1.3 MG/DL (ref 0.5–0.9)
CRYSTALS URNS MICRO: NORMAL /HPF
EOSINOPHIL # BLD: 0.1 K/UL (ref 0–0.6)
EOSINOPHIL NFR BLD: 0.6 % (ref 0–5)
EPI CELLS #/AREA URNS AUTO: 1 /HPF (ref 0–5)
ERYTHROCYTE [DISTWIDTH] IN BLOOD BY AUTOMATED COUNT: 11.8 % (ref 11.5–14.5)
FLUAV H1 2009 PAN RNA NPH NAA+NON-PROBE: DETECTED
FLUBV RNA NPH QL NAA+NON-PROBE: NOT DETECTED
GLUCOSE SERPL-MCNC: 91 MG/DL (ref 70–99)
GLUCOSE UR STRIP.AUTO-MCNC: NEGATIVE MG/DL
HADV DNA NPH QL NAA+NON-PROBE: NOT DETECTED
HCOV 229E RNA NPH QL NAA+NON-PROBE: NOT DETECTED
HCOV HKU1 RNA NPH QL NAA+NON-PROBE: NOT DETECTED
HCOV NL63 RNA NPH QL NAA+NON-PROBE: NOT DETECTED
HCOV OC43 RNA NPH QL NAA+NON-PROBE: DETECTED
HCT VFR BLD AUTO: 46.1 % (ref 37–47)
HGB BLD-MCNC: 16.4 G/DL (ref 12–16)
HGB UR STRIP.AUTO-MCNC: ABNORMAL MG/L
HMPV RNA NPH QL NAA+NON-PROBE: NOT DETECTED
HPIV1 RNA NPH QL NAA+NON-PROBE: NOT DETECTED
HPIV2 RNA NPH QL NAA+NON-PROBE: NOT DETECTED
HPIV3 RNA NPH QL NAA+NON-PROBE: NOT DETECTED
HPIV4 RNA NPH QL NAA+NON-PROBE: NOT DETECTED
HYALINE CASTS #/AREA URNS AUTO: 1 /HPF (ref 0–8)
IMM GRANULOCYTES # BLD: 0.1 K/UL
KETONES UR STRIP.AUTO-MCNC: 40 MG/DL
LEUKOCYTE ESTERASE UR QL STRIP.AUTO: ABNORMAL
LYMPHOCYTES # BLD: 2.9 K/UL (ref 1.1–4.5)
LYMPHOCYTES NFR BLD: 30.2 % (ref 20–40)
M PNEUMO DNA NPH QL NAA+NON-PROBE: NOT DETECTED
MAGNESIUM SERPL-MCNC: 2.3 MG/DL (ref 1.6–2.4)
MCH RBC QN AUTO: 32.5 PG (ref 27–31)
MCHC RBC AUTO-ENTMCNC: 35.6 G/DL (ref 33–37)
MCV RBC AUTO: 91.3 FL (ref 81–99)
MONOCYTES # BLD: 0.8 K/UL (ref 0–0.9)
MONOCYTES NFR BLD: 8.5 % (ref 0–10)
NEUTROPHILS # BLD: 5.6 K/UL (ref 1.5–7.5)
NEUTS SEG NFR BLD: 59.5 % (ref 50–65)
NITRITE UR QL STRIP.AUTO: NEGATIVE
PH UR STRIP.AUTO: 6 [PH] (ref 5–8)
PLATELET # BLD AUTO: 332 K/UL (ref 130–400)
PMV BLD AUTO: 9.3 FL (ref 9.4–12.3)
POTASSIUM SERPL-SCNC: 4.1 MMOL/L (ref 3.5–5.1)
PROT SERPL-MCNC: 9 G/DL (ref 6.4–8.3)
PROT UR STRIP.AUTO-MCNC: NEGATIVE MG/DL
RBC # BLD AUTO: 5.05 M/UL (ref 4.2–5.4)
RBC #/AREA URNS AUTO: 2 /HPF (ref 0–4)
RSV RNA NPH QL NAA+NON-PROBE: NOT DETECTED
RV+EV RNA NPH QL NAA+NON-PROBE: NOT DETECTED
SARS-COV-2 RNA NPH QL NAA+NON-PROBE: NOT DETECTED
SODIUM SERPL-SCNC: 123 MMOL/L (ref 136–145)
SP GR UR STRIP.AUTO: 1.01 (ref 1–1.03)
UROBILINOGEN UR STRIP.AUTO-MCNC: 0.2 E.U./DL
WBC # BLD AUTO: 9.5 K/UL (ref 4.8–10.8)
WBC #/AREA URNS AUTO: 5 /HPF (ref 0–5)

## 2025-02-22 PROCEDURE — 1200000000 HC SEMI PRIVATE

## 2025-02-22 PROCEDURE — 2580000003 HC RX 258: Performed by: INTERNAL MEDICINE

## 2025-02-22 PROCEDURE — 0202U NFCT DS 22 TRGT SARS-COV-2: CPT

## 2025-02-22 PROCEDURE — 6360000002 HC RX W HCPCS: Performed by: STUDENT IN AN ORGANIZED HEALTH CARE EDUCATION/TRAINING PROGRAM

## 2025-02-22 PROCEDURE — 82550 ASSAY OF CK (CPK): CPT

## 2025-02-22 PROCEDURE — 36415 COLL VENOUS BLD VENIPUNCTURE: CPT

## 2025-02-22 PROCEDURE — 71045 X-RAY EXAM CHEST 1 VIEW: CPT

## 2025-02-22 PROCEDURE — 2500000003 HC RX 250 WO HCPCS: Performed by: INTERNAL MEDICINE

## 2025-02-22 PROCEDURE — 83735 ASSAY OF MAGNESIUM: CPT

## 2025-02-22 PROCEDURE — 85025 COMPLETE CBC W/AUTO DIFF WBC: CPT

## 2025-02-22 PROCEDURE — 81001 URINALYSIS AUTO W/SCOPE: CPT

## 2025-02-22 PROCEDURE — 99285 EMERGENCY DEPT VISIT HI MDM: CPT

## 2025-02-22 PROCEDURE — 2580000003 HC RX 258: Performed by: STUDENT IN AN ORGANIZED HEALTH CARE EDUCATION/TRAINING PROGRAM

## 2025-02-22 PROCEDURE — 80053 COMPREHEN METABOLIC PANEL: CPT

## 2025-02-22 RX ORDER — ONDANSETRON 2 MG/ML
4 INJECTION INTRAMUSCULAR; INTRAVENOUS ONCE
Status: COMPLETED | OUTPATIENT
Start: 2025-02-22 | End: 2025-02-22

## 2025-02-22 RX ORDER — ONDANSETRON 2 MG/ML
4 INJECTION INTRAMUSCULAR; INTRAVENOUS EVERY 6 HOURS PRN
Status: DISCONTINUED | OUTPATIENT
Start: 2025-02-22 | End: 2025-02-25 | Stop reason: HOSPADM

## 2025-02-22 RX ORDER — SODIUM CHLORIDE 9 MG/ML
INJECTION, SOLUTION INTRAVENOUS CONTINUOUS
Status: ACTIVE | OUTPATIENT
Start: 2025-02-22 | End: 2025-02-23

## 2025-02-22 RX ORDER — POLYETHYLENE GLYCOL 3350 17 G/17G
17 POWDER, FOR SOLUTION ORAL DAILY PRN
Status: DISCONTINUED | OUTPATIENT
Start: 2025-02-22 | End: 2025-02-25 | Stop reason: HOSPADM

## 2025-02-22 RX ORDER — LISINOPRIL 20 MG/1
20 TABLET ORAL DAILY
COMMUNITY

## 2025-02-22 RX ORDER — ENOXAPARIN SODIUM 100 MG/ML
40 INJECTION SUBCUTANEOUS DAILY
Status: DISCONTINUED | OUTPATIENT
Start: 2025-02-23 | End: 2025-02-25 | Stop reason: HOSPADM

## 2025-02-22 RX ORDER — ATORVASTATIN CALCIUM 40 MG/1
40 TABLET, FILM COATED ORAL DAILY
COMMUNITY

## 2025-02-22 RX ORDER — POTASSIUM CHLORIDE 7.45 MG/ML
10 INJECTION INTRAVENOUS PRN
Status: DISCONTINUED | OUTPATIENT
Start: 2025-02-22 | End: 2025-02-25 | Stop reason: HOSPADM

## 2025-02-22 RX ORDER — 0.9 % SODIUM CHLORIDE 0.9 %
1000 INTRAVENOUS SOLUTION INTRAVENOUS ONCE
Status: COMPLETED | OUTPATIENT
Start: 2025-02-22 | End: 2025-02-22

## 2025-02-22 RX ORDER — ALBUTEROL SULFATE 90 UG/1
2 AEROSOL, METERED RESPIRATORY (INHALATION) EVERY 4 HOURS PRN
COMMUNITY
Start: 2024-11-19

## 2025-02-22 RX ORDER — MAGNESIUM SULFATE IN WATER 40 MG/ML
2000 INJECTION, SOLUTION INTRAVENOUS PRN
Status: DISCONTINUED | OUTPATIENT
Start: 2025-02-22 | End: 2025-02-25 | Stop reason: HOSPADM

## 2025-02-22 RX ORDER — ACETAMINOPHEN 650 MG/1
650 SUPPOSITORY RECTAL EVERY 6 HOURS PRN
Status: DISCONTINUED | OUTPATIENT
Start: 2025-02-22 | End: 2025-02-25 | Stop reason: HOSPADM

## 2025-02-22 RX ORDER — POTASSIUM CHLORIDE 1500 MG/1
40 TABLET, EXTENDED RELEASE ORAL PRN
Status: DISCONTINUED | OUTPATIENT
Start: 2025-02-22 | End: 2025-02-25 | Stop reason: HOSPADM

## 2025-02-22 RX ORDER — FLUTICASONE FUROATE, UMECLIDINIUM BROMIDE AND VILANTEROL TRIFENATATE 200; 62.5; 25 UG/1; UG/1; UG/1
1 POWDER RESPIRATORY (INHALATION) DAILY
COMMUNITY

## 2025-02-22 RX ORDER — ONDANSETRON 4 MG/1
4 TABLET, ORALLY DISINTEGRATING ORAL EVERY 8 HOURS PRN
Status: DISCONTINUED | OUTPATIENT
Start: 2025-02-22 | End: 2025-02-25 | Stop reason: HOSPADM

## 2025-02-22 RX ORDER — ACETAMINOPHEN 325 MG/1
650 TABLET ORAL EVERY 6 HOURS PRN
Status: DISCONTINUED | OUTPATIENT
Start: 2025-02-22 | End: 2025-02-25 | Stop reason: HOSPADM

## 2025-02-22 RX ORDER — ATORVASTATIN CALCIUM 40 MG/1
40 TABLET, FILM COATED ORAL DAILY
Status: DISCONTINUED | OUTPATIENT
Start: 2025-02-23 | End: 2025-02-25 | Stop reason: HOSPADM

## 2025-02-22 RX ORDER — SODIUM CHLORIDE 9 MG/ML
INJECTION, SOLUTION INTRAVENOUS PRN
Status: DISCONTINUED | OUTPATIENT
Start: 2025-02-22 | End: 2025-02-25 | Stop reason: HOSPADM

## 2025-02-22 RX ORDER — SODIUM CHLORIDE 0.9 % (FLUSH) 0.9 %
5-40 SYRINGE (ML) INJECTION EVERY 12 HOURS SCHEDULED
Status: DISCONTINUED | OUTPATIENT
Start: 2025-02-22 | End: 2025-02-25 | Stop reason: HOSPADM

## 2025-02-22 RX ORDER — SODIUM CHLORIDE 0.9 % (FLUSH) 0.9 %
5-40 SYRINGE (ML) INJECTION PRN
Status: DISCONTINUED | OUTPATIENT
Start: 2025-02-22 | End: 2025-02-25 | Stop reason: HOSPADM

## 2025-02-22 RX ORDER — HYDROCHLOROTHIAZIDE 25 MG/1
25 TABLET ORAL DAILY
Status: ON HOLD | COMMUNITY
End: 2025-02-25 | Stop reason: HOSPADM

## 2025-02-22 RX ORDER — BUDESONIDE AND FORMOTEROL FUMARATE DIHYDRATE 160; 4.5 UG/1; UG/1
2 AEROSOL RESPIRATORY (INHALATION)
Status: DISCONTINUED | OUTPATIENT
Start: 2025-02-23 | End: 2025-02-25 | Stop reason: HOSPADM

## 2025-02-22 RX ORDER — ALBUTEROL SULFATE 90 UG/1
2 INHALANT RESPIRATORY (INHALATION) EVERY 4 HOURS PRN
Status: DISCONTINUED | OUTPATIENT
Start: 2025-02-22 | End: 2025-02-25 | Stop reason: HOSPADM

## 2025-02-22 RX ADMIN — SODIUM CHLORIDE, PRESERVATIVE FREE 10 ML: 5 INJECTION INTRAVENOUS at 23:52

## 2025-02-22 RX ADMIN — ONDANSETRON 4 MG: 2 INJECTION, SOLUTION INTRAMUSCULAR; INTRAVENOUS at 21:12

## 2025-02-22 RX ADMIN — SODIUM CHLORIDE 1000 ML: 9 INJECTION, SOLUTION INTRAVENOUS at 20:23

## 2025-02-22 RX ADMIN — SODIUM CHLORIDE: 9 INJECTION, SOLUTION INTRAVENOUS at 23:52

## 2025-02-22 SDOH — ECONOMIC STABILITY: INCOME INSECURITY: IN THE PAST 12 MONTHS, HAS THE ELECTRIC, GAS, OIL, OR WATER COMPANY THREATENED TO SHUT OFF SERVICE IN YOUR HOME?: NO

## 2025-02-22 SDOH — ECONOMIC STABILITY: INCOME INSECURITY: HOW HARD IS IT FOR YOU TO PAY FOR THE VERY BASICS LIKE FOOD, HOUSING, MEDICAL CARE, AND HEATING?: NOT HARD AT ALL

## 2025-02-22 SDOH — ECONOMIC STABILITY: FOOD INSECURITY: WITHIN THE PAST 12 MONTHS, YOU WORRIED THAT YOUR FOOD WOULD RUN OUT BEFORE YOU GOT MONEY TO BUY MORE.: NEVER TRUE

## 2025-02-22 ASSESSMENT — PAIN DESCRIPTION - LOCATION: LOCATION: LEG

## 2025-02-22 ASSESSMENT — PATIENT HEALTH QUESTIONNAIRE - PHQ9
SUM OF ALL RESPONSES TO PHQ QUESTIONS 1-9: 0
1. LITTLE INTEREST OR PLEASURE IN DOING THINGS: NOT AT ALL
SUM OF ALL RESPONSES TO PHQ QUESTIONS 1-9: 0
SUM OF ALL RESPONSES TO PHQ QUESTIONS 1-9: 0
SUM OF ALL RESPONSES TO PHQ9 QUESTIONS 1 & 2: 0
SUM OF ALL RESPONSES TO PHQ QUESTIONS 1-9: 0
2. FEELING DOWN, DEPRESSED OR HOPELESS: NOT AT ALL

## 2025-02-22 ASSESSMENT — PAIN - FUNCTIONAL ASSESSMENT: PAIN_FUNCTIONAL_ASSESSMENT: 0-10

## 2025-02-22 ASSESSMENT — PAIN DESCRIPTION - DESCRIPTORS: DESCRIPTORS: ACHING;TINGLING

## 2025-02-22 ASSESSMENT — PAIN DESCRIPTION - ORIENTATION: ORIENTATION: RIGHT;LEFT

## 2025-02-23 LAB
ANION GAP SERPL CALCULATED.3IONS-SCNC: 13 MMOL/L (ref 8–16)
ANION GAP SERPL CALCULATED.3IONS-SCNC: 15 MMOL/L (ref 8–16)
ANION GAP SERPL CALCULATED.3IONS-SCNC: 15 MMOL/L (ref 8–16)
BASOPHILS # BLD: 0 K/UL (ref 0–0.2)
BASOPHILS NFR BLD: 0.2 % (ref 0–1)
BUN SERPL-MCNC: 20 MG/DL (ref 8–23)
BUN SERPL-MCNC: 20 MG/DL (ref 8–23)
BUN SERPL-MCNC: 24 MG/DL (ref 8–23)
CALCIUM SERPL-MCNC: 8.9 MG/DL (ref 8.8–10.2)
CALCIUM SERPL-MCNC: 9.1 MG/DL (ref 8.8–10.2)
CALCIUM SERPL-MCNC: 9.1 MG/DL (ref 8.8–10.2)
CHLORIDE SERPL-SCNC: 87 MMOL/L (ref 98–107)
CHLORIDE SERPL-SCNC: 87 MMOL/L (ref 98–107)
CHLORIDE SERPL-SCNC: 88 MMOL/L (ref 98–107)
CO2 SERPL-SCNC: 23 MMOL/L (ref 22–29)
CO2 SERPL-SCNC: 24 MMOL/L (ref 22–29)
CO2 SERPL-SCNC: 24 MMOL/L (ref 22–29)
CREAT SERPL-MCNC: 1 MG/DL (ref 0.5–0.9)
CREAT SERPL-MCNC: 1.1 MG/DL (ref 0.5–0.9)
CREAT SERPL-MCNC: 1.2 MG/DL (ref 0.5–0.9)
EOSINOPHIL # BLD: 0.2 K/UL (ref 0–0.6)
EOSINOPHIL NFR BLD: 1.7 % (ref 0–5)
ERYTHROCYTE [DISTWIDTH] IN BLOOD BY AUTOMATED COUNT: 11.9 % (ref 11.5–14.5)
GLUCOSE SERPL-MCNC: 109 MG/DL (ref 70–99)
GLUCOSE SERPL-MCNC: 127 MG/DL (ref 70–99)
GLUCOSE SERPL-MCNC: 92 MG/DL (ref 70–99)
HCT VFR BLD AUTO: 38.6 % (ref 37–47)
HGB BLD-MCNC: 13.1 G/DL (ref 12–16)
IMM GRANULOCYTES # BLD: 0.1 K/UL
LYMPHOCYTES # BLD: 2.8 K/UL (ref 1.1–4.5)
LYMPHOCYTES NFR BLD: 31.6 % (ref 20–40)
MCH RBC QN AUTO: 32.1 PG (ref 27–31)
MCHC RBC AUTO-ENTMCNC: 33.9 G/DL (ref 33–37)
MCV RBC AUTO: 94.6 FL (ref 81–99)
MONOCYTES # BLD: 0.9 K/UL (ref 0–0.9)
MONOCYTES NFR BLD: 10.7 % (ref 0–10)
NEUTROPHILS # BLD: 4.8 K/UL (ref 1.5–7.5)
NEUTS SEG NFR BLD: 55 % (ref 50–65)
PLATELET # BLD AUTO: 272 K/UL (ref 130–400)
PMV BLD AUTO: 9.6 FL (ref 9.4–12.3)
POTASSIUM SERPL-SCNC: 3.9 MMOL/L (ref 3.5–5.1)
POTASSIUM SERPL-SCNC: 4.1 MMOL/L (ref 3.5–5.1)
POTASSIUM SERPL-SCNC: 4.3 MMOL/L (ref 3.5–5.1)
RBC # BLD AUTO: 4.08 M/UL (ref 4.2–5.4)
SODIUM SERPL-SCNC: 125 MMOL/L (ref 136–145)
SODIUM SERPL-SCNC: 125 MMOL/L (ref 136–145)
SODIUM SERPL-SCNC: 126 MMOL/L (ref 136–145)
WBC # BLD AUTO: 8.7 K/UL (ref 4.8–10.8)

## 2025-02-23 PROCEDURE — 36415 COLL VENOUS BLD VENIPUNCTURE: CPT

## 2025-02-23 PROCEDURE — 94640 AIRWAY INHALATION TREATMENT: CPT

## 2025-02-23 PROCEDURE — 6360000002 HC RX W HCPCS: Performed by: INTERNAL MEDICINE

## 2025-02-23 PROCEDURE — 80048 BASIC METABOLIC PNL TOTAL CA: CPT

## 2025-02-23 PROCEDURE — 6370000000 HC RX 637 (ALT 250 FOR IP): Performed by: INTERNAL MEDICINE

## 2025-02-23 PROCEDURE — 1200000000 HC SEMI PRIVATE

## 2025-02-23 PROCEDURE — 2500000003 HC RX 250 WO HCPCS: Performed by: INTERNAL MEDICINE

## 2025-02-23 PROCEDURE — 85025 COMPLETE CBC W/AUTO DIFF WBC: CPT

## 2025-02-23 RX ADMIN — SODIUM CHLORIDE, PRESERVATIVE FREE 10 ML: 5 INJECTION INTRAVENOUS at 11:06

## 2025-02-23 RX ADMIN — ENOXAPARIN SODIUM 40 MG: 100 INJECTION SUBCUTANEOUS at 09:58

## 2025-02-23 RX ADMIN — ATORVASTATIN CALCIUM 40 MG: 40 TABLET, FILM COATED ORAL at 09:58

## 2025-02-23 RX ADMIN — BUDESONIDE AND FORMOTEROL FUMARATE DIHYDRATE 2 PUFF: 160; 4.5 AEROSOL RESPIRATORY (INHALATION) at 19:04

## 2025-02-23 ASSESSMENT — PAIN - FUNCTIONAL ASSESSMENT: PAIN_FUNCTIONAL_ASSESSMENT: PREVENTS OR INTERFERES SOME ACTIVE ACTIVITIES AND ADLS

## 2025-02-23 ASSESSMENT — PAIN DESCRIPTION - DESCRIPTORS: DESCRIPTORS: ACHING

## 2025-02-23 ASSESSMENT — PAIN SCALES - GENERAL: PAINLEVEL_OUTOF10: 0

## 2025-02-23 ASSESSMENT — PAIN DESCRIPTION - LOCATION: LOCATION: ARM

## 2025-02-23 ASSESSMENT — PAIN DESCRIPTION - ORIENTATION: ORIENTATION: RIGHT;LEFT

## 2025-02-23 NOTE — PLAN OF CARE
Problem: Discharge Planning  Goal: Discharge to home or other facility with appropriate resources  Recent Flowsheet Documentation  Taken 2/23/2025 0006 by Zina Nance RN  Discharge to home or other facility with appropriate resources:   Identify barriers to discharge with patient and caregiver   Identify discharge learning needs (meds, wound care, etc)   Refer to discharge planning if patient needs post-hospital services based on physician order or complex needs related to functional status, cognitive ability or social support system   Arrange for needed discharge resources and transportation as appropriate      Left message with request for call back and left [facility] phone number. Stated noticed patient has cancelled multiple [facility] appointments in [address].  Requested call back to confirm if planning on coming for appointments in Feb and on 12/26 or if these

## 2025-02-23 NOTE — PROGRESS NOTES
Hospitalist Progress Note    Patient:  Dana Clemons  YOB: 1955  Date of Service: 2/23/2025  MRN: [id number]   Acct: [id number]   Primary Care Physician: Trino Azar MD  Advance Directive: Full Code  Admit Date: 2/22/2025       Hospital Day: 1  Referring Provider: Aryan Melgar DO    Patient Seen, Chart, Consults, Notes, Labs, Radiology studies reviewed.    Subjective:  Dana Clemons is a 69 y.o. female  whom we are following for COPD, Influenza A, hyponatremia.  She is feeling better.  Leg pain has decreased.  No significant symptoms related to her flu infection.  She is also positive for non COVID coronavirus.    Allergies:  Patient has no known allergies.    Medicines:  Current Facility-Administered Medications   Medication Dose Route Frequency Provider Last Rate Last Admin    atorvastatin (LIPITOR) tablet 40 mg  40 mg Oral Daily Dex Sage MD   40 mg at 02/23/25 0958    budesonide-formoterol (SYMBICORT) 160-4.5 MCG/ACT inhaler 2 puff  2 puff Inhalation BID RT Dex Sage MD        And    tiotropium (SPIRIVA RESPIMAT) 2.5 MCG/ACT inhaler 2 puff  2 puff Inhalation Daily RT Dex Sage MD        albuterol sulfate HFA (PROVENTIL;VENTOLIN;PROAIR) 108 (90 Base) MCG/ACT inhaler 2 puff  2 puff Inhalation Q4H PRN Dex Sage MD        sodium chloride flush 0.9 % injection 5-40 mL  5-40 mL IntraVENous 2 times per day Dex Sage MD   10 mL at 02/22/25 2352    sodium chloride flush 0.9 % injection 5-40 mL  5-40 mL IntraVENous PRN Dex Sage MD        0.9 % sodium chloride infusion   IntraVENous PRN Dex Sage MD        potassium chloride (KLOR-CON M) extended release tablet 40 mEq  40 mEq Oral PRN Dex Sage MD        Or    potassium bicarb-citric acid (EFFER-K) effervescent tablet 40 mEq  40 mEq Oral PRN Dex Sage MD        Or    potassium chloride 10 mEq/100 mL IVPB (Peripheral Line)  10 mEq IntraVENous PRN Dex Sage MD

## 2025-02-23 NOTE — ED PROVIDER NOTES
in the Last Year: Never true   Transportation Needs: No Transportation Needs (3/6/2023)    Received from [facility]    PRAPARE - Transportation     In the past 12 months, has lack of transportation kept you from medical appointments or from getting medications?: No     In the past 12 months, has lack of transportation kept you from meetings, work, or from getting things needed for daily living?: No   Physical Activity: Patient Declined (3/6/2023)    Received from [facility]    Exercise Vital Sign     Days of Exercise per Week: Patient declined     Minutes of Exercise per Session: Patient declined    Received from [facility]    Family and Community Support    Received from [facility]    Abuse Screen    Received from [facility]    Housing Stability       SCREENINGS    Hedrick Coma Scale  Eye Opening: Spontaneous  Best Verbal Response: Oriented  Best Motor Response: Obeys commands  Hedrick Coma Scale Score: 15        PHYSICAL EXAM    (up to 7 for level 4, 8 or more for level 5)     ED Triage Vitals [02/22/25 1730]   BP Systolic BP Percentile Diastolic BP Percentile Temp Temp Source Pulse Respirations SpO2   (!) 150/77 -- -- 98.8 °F (37.1 °C) Temporal 84 18 99 %      Height Weight - Scale         1.651 m (5' 5\") 54.4 kg (120 lb)             Physical Exam    DIAGNOSTIC RESULTS     EKG: All EKG's areinterpreted by the Emergency Department Physician who either signs or Co-signs this chart in the absence of a cardiologist.    EKG not indicated    RADIOLOGY:  Non-plain film images such as CT, Ultrasound and MRI are read by the radiologist. Plain radiographic images are visualized and preliminarily interpreted bythe emergency physician with the below findings:      XR CHEST PORTABLE   Final Result       1. No acute findings.                       ______________________________________    Electronically signed by: CARLTON WEST M.D.   Date:     02/22/2025

## 2025-02-23 NOTE — ED NOTES
ED TO INPATIENT SBAR HANDOFF    Patient Name: Dana Clemons   : 1955  69 y.o.   Family/Caregiver Present: Yes  Code Status Order: No Order    C-SSRS: Risk of Suicide: No Risk  Sitter No  Restraints:         Situation  Chief Complaint:   Chief Complaint   Patient presents with    Leg Pain    Shortness of Breath     Bilateral leg pain/weakness that has been going on for a year  Increased weakness, fatigue.  Reports shortness of breath upon exertion.      Patient Diagnosis: No admission diagnoses are documented for this encounter.     Brief Description of Patient's Condition: presents with bilateral leg aching, pt has been feeling unwell for several days with nausea, weakness and decreased appetite.    Mental Status: oriented, alert, coherent, and logical  Arrived from: home    Imaging:   XR CHEST PORTABLE   Final Result       1. No acute findings.                       ______________________________________    Electronically signed by: CARLTON WEST M.D.   Date:     2025   Time:    20:08         COVID-19 Results:   Internal Administration   First Dose      Second Dose           Last COVID Lab No results found for: \"SARS-COV-2\"        Abnormal labs:   Abnormal Labs Reviewed   CBC WITH AUTO DIFFERENTIAL - Abnormal; Notable for the following components:       Result Value    Hemoglobin 16.4 (*)     MCH 32.5 (*)     MPV 9.3 (*)     All other components within normal limits   COMPREHENSIVE METABOLIC PANEL - Abnormal; Notable for the following components:    Sodium 123 (*)     Chloride 76 (*)     Anion Gap 25 (*)     BUN 28 (*)     Creatinine 1.3 (*)     Est, Glom Filt Rate 45 (*)     Calcium 11.4 (*)     Total Protein 9.0 (*)     Albumin 5.5 (*)     Total Bilirubin 1.3 (*)     AST 39 (*)     All other components within normal limits     Background  Allergies: No Known Allergies  Current Medications:   Medications Administered         sodium chloride 0.9 % bolus 1,000 mL Admin Date  2025 Action  New Bag

## 2025-02-23 NOTE — PLAN OF CARE
Problem: Discharge Planning  Goal: Discharge to home or other facility with appropriate resources  Recent Flowsheet Documentation  Taken 2/23/2025 0006 by Zina Nance RN  Discharge to home or other facility with appropriate resources:   Identify barriers to discharge with patient and caregiver   Identify discharge learning needs (meds, wound care, etc)   Refer to discharge planning if patient needs post-hospital services based on physician order or complex needs related to functional status, cognitive ability or social support system   Arrange for needed discharge resources and transportation as appropriate

## 2025-02-23 NOTE — PLAN OF CARE
Problem: Discharge Planning  Goal: Discharge to home or other facility with appropriate resources  2/23/2025 1040 by Vee Lacey, RN  Outcome: Progressing  2/23/2025 0555 by Zina Nance, RN  Outcome: Progressing  Flowsheets (Taken 2/23/2025 0006)  Discharge to home or other facility with appropriate resources:   Identify barriers to discharge with patient and caregiver   Identify discharge learning needs (meds, wound care, etc)   Refer to discharge planning if patient needs post-hospital services based on physician order or complex needs related to functional status, cognitive ability or social support system   Arrange for needed discharge resources and transportation as appropriate     Problem: Pain  Goal: Verbalizes/displays adequate comfort level or baseline comfort level  2/23/2025 1040 by Vee Lacey, RN  Outcome: Progressing  2/23/2025 0555 by Zina Nance, RN  Outcome: Progressing  Flowsheets (Taken 2/23/2025 0555)  Verbalizes/displays adequate comfort level or baseline comfort level:   Encourage patient to monitor pain and request assistance   Consider cultural and social influences on pain and pain management   Administer analgesics based on type and severity of pain and evaluate response

## 2025-02-23 NOTE — PROGRESS NOTES
4 Eyes Skin Assessment     NAME:  Dana Clemons  YOB: 1955  MEDICAL RECORD NUMBER:  [id number]    The patient is being assessed for  Admission    I agree that at least one RN has performed a thorough Head to Toe Skin Assessment on the patient. ALL assessment sites listed below have been assessed.      Areas assessed by both nurses:    Head, Face, Ears, Shoulders, Back, Chest, Arms, Elbows, Hands, Sacrum. Buttock, Coccyx, Ischium, and Legs. Feet and Heels        Does the Patient have a Wound? No noted wound(s)       Da Prevention initiated by RN: No  Wound Care Orders initiated by RN: No    Pressure Injury (Stage 3,4, Unstageable, DTI, NWPT, and Complex wounds) if present, place Wound referral order by RN under : No    New Ostomies, if present place, Ostomy referral order under : No     Nurse 1 eSignature: Electronically signed by Zina Nance RN on 2/22/25 at 11:36 PM CST    **SHARE this note so that the co-signing nurse can place an eSignature**    Nurse 2 eSignature: {Esignature:[id number]}

## 2025-02-23 NOTE — PROGRESS NOTES
Dana Clemons arrived to room # 309.   Presented with: Weaknesss ,shortness of breath  Mental Status: Patient is oriented and alert.   Vitals:    02/22/25 2134   BP: (!) 113/59   Pulse: 62   Resp: 16   Temp: 96.8 °F (36 °C)   SpO2: 99%     Patient safety contract and falls prevention contract reviewed with patient Yes.  Oriented Patient to room.  Call light within reach. Yes.  Needs, issues or concerns expressed at this time: no.      Electronically signed by Zina Nance RN on 2/22/2025 at 11:33 PM

## 2025-02-23 NOTE — H&P
[facility]      Hospitalist - History & Physical      PCP: Trino Azar MD    Date of Admission: 2/22/2025    Date of Service: 2/22/2025    Chief Complaint:  Leg pain    History Of Present Illness:   The patient is a 69 y.o. female with COPD comes to ED complaining of leg pain.  Patient is very fatigued and lethargic and is unable to tell me how long she has been having pain.  She states that it does get worse with activity and that she also has shortness of breath that gets worse with activity.  She states she has been very weak, fatigued and recently has had some chills.  She denies any fevers.  Currently she is complaining of some nausea and she does admit to some confusion.  Denies any cough or headaches, congestion.  She does note some numbness and tingling in her hands occasionally.    In ED: CXR with no acute findings; UA unremarkable for UTI; PCR panel positive for coronavirus OC 43 and influenza A; WBC 9.5, H&H 16.4/46.1, total bilirubin 1.3, AST 39, ALT 22, creatinine 1.3, BUN 28, GFR 45, calcium 11.4, sodium 123.  Patient will be admitted inpatient to hospitalist.    Past Medical History:        Diagnosis Date    COPD (chronic obstructive pulmonary disease) (HCC)        Past Surgical History:        Procedure Laterality Date    ANKLE SURGERY Left 03/03/2022       Home Medications:  Prior to Admission medications    Medication Sig Start Date End Date Taking? Authorizing Provider   VENTOLIN  (90 Base) MCG/ACT inhaler Inhale 2 puffs into the lungs every 4 hours as needed 11/19/24  Yes Dawson Dong MD TRELEGY ELLIPTA 200-62.5-25 MCG/ACT AEPB inhaler Inhale 1 puff into the lungs daily    Dawson Dong MD   atorvastatin (LIPITOR) 40 MG tablet Take 1 tablet by mouth daily    Dawson Dong MD   hydroCHLOROthiazide (HYDRODIURIL) 25 MG tablet Take 1 tablet by mouth daily    Dawson Dong MD   lisinopril (PRINIVIL;ZESTRIL) 20 MG tablet Take 1 tablet by

## 2025-02-23 NOTE — ED NOTES
Pt states that she is not nauseous at this time.  Notified pt that I will get it and give it if/when she needs it

## 2025-02-24 PROCEDURE — 97110 THERAPEUTIC EXERCISES: CPT

## 2025-02-24 PROCEDURE — 94760 N-INVAS EAR/PLS OXIMETRY 1: CPT

## 2025-02-24 PROCEDURE — 97535 SELF CARE MNGMENT TRAINING: CPT

## 2025-02-24 PROCEDURE — 6370000000 HC RX 637 (ALT 250 FOR IP): Performed by: INTERNAL MEDICINE

## 2025-02-24 PROCEDURE — 97165 OT EVAL LOW COMPLEX 30 MIN: CPT

## 2025-02-24 PROCEDURE — 6360000002 HC RX W HCPCS: Performed by: INTERNAL MEDICINE

## 2025-02-24 PROCEDURE — 97161 PT EVAL LOW COMPLEX 20 MIN: CPT

## 2025-02-24 PROCEDURE — 94640 AIRWAY INHALATION TREATMENT: CPT

## 2025-02-24 PROCEDURE — 1200000000 HC SEMI PRIVATE

## 2025-02-24 PROCEDURE — 2500000003 HC RX 250 WO HCPCS: Performed by: INTERNAL MEDICINE

## 2025-02-24 RX ORDER — DOCUSATE SODIUM 100 MG/1
100 CAPSULE, LIQUID FILLED ORAL 2 TIMES DAILY
Status: DISCONTINUED | OUTPATIENT
Start: 2025-02-24 | End: 2025-02-25 | Stop reason: HOSPADM

## 2025-02-24 RX ORDER — BISACODYL 10 MG
10 SUPPOSITORY, RECTAL RECTAL DAILY PRN
Status: DISCONTINUED | OUTPATIENT
Start: 2025-02-24 | End: 2025-02-25 | Stop reason: HOSPADM

## 2025-02-24 RX ORDER — FUROSEMIDE 20 MG/1
20 TABLET ORAL 2 TIMES DAILY
Status: DISCONTINUED | OUTPATIENT
Start: 2025-02-24 | End: 2025-02-25 | Stop reason: HOSPADM

## 2025-02-24 RX ORDER — DOCUSATE SODIUM 100 MG/1
100 CAPSULE, LIQUID FILLED ORAL 2 TIMES DAILY
Status: DISCONTINUED | OUTPATIENT
Start: 2025-02-24 | End: 2025-02-24

## 2025-02-24 RX ADMIN — FUROSEMIDE 20 MG: 20 TABLET ORAL at 16:58

## 2025-02-24 RX ADMIN — SODIUM CHLORIDE, PRESERVATIVE FREE 10 ML: 5 INJECTION INTRAVENOUS at 20:28

## 2025-02-24 RX ADMIN — POLYETHYLENE GLYCOL 3350 17 G: 17 POWDER, FOR SOLUTION ORAL at 10:45

## 2025-02-24 RX ADMIN — SODIUM CHLORIDE, PRESERVATIVE FREE 10 ML: 5 INJECTION INTRAVENOUS at 09:00

## 2025-02-24 RX ADMIN — FUROSEMIDE 20 MG: 20 TABLET ORAL at 10:37

## 2025-02-24 RX ADMIN — ENOXAPARIN SODIUM 40 MG: 100 INJECTION SUBCUTANEOUS at 09:00

## 2025-02-24 RX ADMIN — DOCUSATE SODIUM 100 MG: 100 CAPSULE, LIQUID FILLED ORAL at 16:58

## 2025-02-24 RX ADMIN — BUDESONIDE AND FORMOTEROL FUMARATE DIHYDRATE 2 PUFF: 160; 4.5 AEROSOL RESPIRATORY (INHALATION) at 06:12

## 2025-02-24 RX ADMIN — BUDESONIDE AND FORMOTEROL FUMARATE DIHYDRATE 2 PUFF: 160; 4.5 AEROSOL RESPIRATORY (INHALATION) at 19:21

## 2025-02-24 RX ADMIN — ATORVASTATIN CALCIUM 40 MG: 40 TABLET, FILM COATED ORAL at 09:00

## 2025-02-24 RX ADMIN — TIOTROPIUM BROMIDE INHALATION SPRAY 2 PUFF: 3.12 SPRAY, METERED RESPIRATORY (INHALATION) at 06:12

## 2025-02-24 RX ADMIN — BISACODYL 10 MG: 10 SUPPOSITORY RECTAL at 16:58

## 2025-02-24 ASSESSMENT — ENCOUNTER SYMPTOMS
NAUSEA: 0
CONSTIPATION: 0
VOICE CHANGE: 0
COLOR CHANGE: 0
RHINORRHEA: 0
SHORTNESS OF BREATH: 0
BACK PAIN: 0
VOMITING: 0
DIARRHEA: 0
COUGH: 0

## 2025-02-24 NOTE — PROGRESS NOTES
Physical Therapy  Facility/Department: [facility] RASHIDA/VAS/MED  Physical Therapy Initial Assessment    Name: Dana Clemons  : 1955  MRN: [id number]  Date of Service: 2025    Discharge Recommendations:  Home with assist PRN          Patient Diagnosis(es): The primary encounter diagnosis was Hyponatremia. A diagnosis of Generalized weakness was also pertinent to this visit.  Past Medical History:  has a past medical history of COPD (chronic obstructive pulmonary disease) (HCC).  Past Surgical History:  has a past surgical history that includes Ankle surgery (Left, 2022).    Assessment  Assessment: Pt. tolerated mobility well. Declined sitting up in chair. Pt. encouraged to sit up for all meals. Pt. declined amb. in hallways. Pt. would be safe to be up ad hans in room, as she had been doing and would be safe to d/c home soon. States she will have family at home to help her. Does not require PT while in acute care.  Treatment Diagnosis: impaired gait and mobility  Therapy Prognosis: Fair  Decision Making: Low Complexity  Barriers to Learning: none  Requires PT Follow-Up: No  Activity Tolerance  Activity Tolerance: Patient tolerated treatment well    Plan  Physical Therapy Plan  General Plan: Discharge with evaluation only  Safety Devices  Type of Devices: Gait belt, Call light within reach, Nurse notified, Left in bed    Restrictions  Restrictions/Precautions  Activity Level: Up with Assist     Subjective  General  Chart Reviewed: Yes  Patient assessed for rehabilitation services?: Yes  Response To Previous Treatment: Not applicable  Family/Caregiver Present: No  Referring Practitioner: Aryan Melgar DO  Referral Date : 25  Diagnosis: Flu, hyponatremia, generalized weakness  Follows Commands: Within Functional Limits  General  General Comments: Tiara CONWAY okayed PT.  Subjective  Subjective: Pt. willing to work with therapy. States she has been getting up on her own in the room. Wants to get stomach

## 2025-02-24 NOTE — PROGRESS NOTES
Occupational Therapy Initial Assessment  Date: 2025   Patient Name: Dana Clemons  MRN: [id number]     : 1955    Date of Service: 2025    Discharge Recommendations:  Home with assist PRN       Assessment   Assessment: Evaluation completed and tx initiated.  All education completed this visit.  No barriers to stated discharge plan identified. Pt performed bed mobility and sit to stand independently. Nursing reports pt has been getting up on her own to use the BR. Pt performed toileting using BSC and ammy-care with independence. Pt reports having support from daughter at home. Pt reports no concerns for returning home once medically stable.  Treatment Diagnosis: Hyponatremia  Prognosis: Good  Decision Making: Low Complexity  REQUIRES OT FOLLOW-UP: No  Activity Tolerance  Activity Tolerance: Patient Tolerated treatment well              Patient Diagnosis(es): The primary encounter diagnosis was Hyponatremia. A diagnosis of Generalized weakness was also pertinent to this visit.    Past Medical History:   Past Medical History:   Diagnosis Date    COPD (chronic obstructive pulmonary disease) (HCC)         Past Surgical History:   Past Surgical History:   Procedure Laterality Date    ANKLE SURGERY Left 2022       Treatment Diagnosis: Hyponatremia      Restrictions  Restrictions/Precautions  Activity Level: Up with Assist    Subjective      Vital Signs  Temp: 97.3 °F (36.3 °C)  Temp Source: Oral  Pulse: 72  Heart Rate Source: Monitor  Respirations: 20  BP: (!) 122/53  MAP (Calculated): 76  MAP (mmHg): 70  BP Location: Left upper arm  BP Method: Automatic  Patient Position: Semi fowlers  Oxygen Therapy  SpO2: 98 %  O2 Device: None (Room air)    Social/Functional History  Social/Functional History  Lives With: Daughter (and grandson)  Type of Home: House  Home Layout: One level  Home Access: Stairs to enter without rails  Entrance Stairs - Number of Steps: 2 STACIE  Bathroom Equipment: 3-in-1 Commode  Home

## 2025-02-24 NOTE — PROGRESS NOTES
Hospitalist Progress Note    Patient:  Dana Clemons  YOB: 1955  Date of Service: 2/24/2025  MRN: [id number]   Acct: [id number]   Primary Care Physician: Trino Azar MD  Advance Directive: Full Code  Admit Date: 2/22/2025       Hospital Day: 2  Referring Provider: Aryan Melgar DO    Patient Seen, Chart, Consults, Notes, Labs, Radiology studies reviewed.    Subjective:  Dana Clemons is a 69 y.o. female  whom we are following for COPD, Influenza A, hyponatremia.  She is complaining of some right upper quadrant pain.  Her serum sodium is improved.  Her leg pain is improved.  I will add twice daily furosemide to her regimen.    Allergies:  Patient has no known allergies.    Medicines:  Current Facility-Administered Medications   Medication Dose Route Frequency Provider Last Rate Last Admin    furosemide (LASIX) tablet 20 mg  20 mg Oral BID Aryan Melgar DO        atorvastatin (LIPITOR) tablet 40 mg  40 mg Oral Daily Dex Sage MD   40 mg at 02/24/25 0900    budesonide-formoterol (SYMBICORT) 160-4.5 MCG/ACT inhaler 2 puff  2 puff Inhalation BID RT Dex Sage MD   2 puff at 02/24/25 0612    And    tiotropium (SPIRIVA RESPIMAT) 2.5 MCG/ACT inhaler 2 puff  2 puff Inhalation Daily RT Dex Sage MD   2 puff at 02/24/25 0612    albuterol sulfate HFA (PROVENTIL;VENTOLIN;PROAIR) 108 (90 Base) MCG/ACT inhaler 2 puff  2 puff Inhalation Q4H PRN Dex Sage MD        sodium chloride flush 0.9 % injection 5-40 mL  5-40 mL IntraVENous 2 times per day Dex Sage MD   10 mL at 02/24/25 0900    sodium chloride flush 0.9 % injection 5-40 mL  5-40 mL IntraVENous PRN Dex Sage MD        0.9 % sodium chloride infusion   IntraVENous PRN Dex Sage MD        potassium chloride (KLOR-CON M) extended release tablet 40 mEq  40 mEq Oral PRN Dex Sage MD        Or    potassium bicarb-citric acid (EFFER-K) effervescent tablet 40 mEq  40 mEq Oral PRN Sage,

## 2025-02-25 ENCOUNTER — APPOINTMENT (OUTPATIENT)
Dept: ULTRASOUND IMAGING | Age: 70
DRG: 640 | End: 2025-02-25
Payer: MEDICARE

## 2025-02-25 VITALS
BODY MASS INDEX: 19.99 KG/M2 | HEART RATE: 66 BPM | OXYGEN SATURATION: 98 % | RESPIRATION RATE: 16 BRPM | WEIGHT: 120 LBS | SYSTOLIC BLOOD PRESSURE: 107 MMHG | TEMPERATURE: 98.1 F | HEIGHT: 65 IN | DIASTOLIC BLOOD PRESSURE: 49 MMHG

## 2025-02-25 PROBLEM — J10.1 INFLUENZA A: Status: RESOLVED | Noted: 2025-02-22 | Resolved: 2025-02-25

## 2025-02-25 PROBLEM — E87.1 HYPONATREMIA: Status: RESOLVED | Noted: 2025-02-22 | Resolved: 2025-02-25

## 2025-02-25 PROBLEM — E78.5 HYPERLIPIDEMIA: Status: RESOLVED | Noted: 2025-02-22 | Resolved: 2025-02-25

## 2025-02-25 LAB
ALBUMIN SERPL-MCNC: 4 G/DL (ref 3.5–5.2)
ALP SERPL-CCNC: 51 U/L (ref 35–104)
ALT SERPL-CCNC: 15 U/L (ref 5–33)
ANION GAP SERPL CALCULATED.3IONS-SCNC: 12 MMOL/L (ref 8–16)
AST SERPL-CCNC: 22 U/L (ref 5–32)
BASOPHILS # BLD: 0 K/UL (ref 0–0.2)
BASOPHILS NFR BLD: 0.3 % (ref 0–1)
BILIRUB SERPL-MCNC: 0.6 MG/DL (ref 0.2–1.2)
BUN SERPL-MCNC: 8 MG/DL (ref 8–23)
CALCIUM SERPL-MCNC: 9.3 MG/DL (ref 8.8–10.2)
CHLORIDE SERPL-SCNC: 92 MMOL/L (ref 98–107)
CO2 SERPL-SCNC: 26 MMOL/L (ref 22–29)
CREAT SERPL-MCNC: 0.9 MG/DL (ref 0.5–0.9)
EOSINOPHIL # BLD: 0.1 K/UL (ref 0–0.6)
EOSINOPHIL NFR BLD: 1.8 % (ref 0–5)
ERYTHROCYTE [DISTWIDTH] IN BLOOD BY AUTOMATED COUNT: 12 % (ref 11.5–14.5)
GLUCOSE SERPL-MCNC: 89 MG/DL (ref 70–99)
HCT VFR BLD AUTO: 34.1 % (ref 37–47)
HGB BLD-MCNC: 11.9 G/DL (ref 12–16)
IMM GRANULOCYTES # BLD: 0 K/UL
LYMPHOCYTES # BLD: 2.2 K/UL (ref 1.1–4.5)
LYMPHOCYTES NFR BLD: 36 % (ref 20–40)
MAGNESIUM SERPL-MCNC: 1.7 MG/DL (ref 1.6–2.4)
MCH RBC QN AUTO: 32.6 PG (ref 27–31)
MCHC RBC AUTO-ENTMCNC: 34.9 G/DL (ref 33–37)
MCV RBC AUTO: 93.4 FL (ref 81–99)
MONOCYTES # BLD: 0.7 K/UL (ref 0–0.9)
MONOCYTES NFR BLD: 12 % (ref 0–10)
NEUTROPHILS # BLD: 2.9 K/UL (ref 1.5–7.5)
NEUTS SEG NFR BLD: 49.2 % (ref 50–65)
PHOSPHATE SERPL-MCNC: 2.8 MG/DL (ref 2.5–4.5)
PLATELET # BLD AUTO: 271 K/UL (ref 130–400)
PMV BLD AUTO: 9.6 FL (ref 9.4–12.3)
POTASSIUM SERPL-SCNC: 4 MMOL/L (ref 3.5–5.1)
PROT SERPL-MCNC: 6 G/DL (ref 6.4–8.3)
RBC # BLD AUTO: 3.65 M/UL (ref 4.2–5.4)
SODIUM SERPL-SCNC: 130 MMOL/L (ref 136–145)
WBC # BLD AUTO: 6 K/UL (ref 4.8–10.8)

## 2025-02-25 PROCEDURE — 94760 N-INVAS EAR/PLS OXIMETRY 1: CPT

## 2025-02-25 PROCEDURE — 6370000000 HC RX 637 (ALT 250 FOR IP): Performed by: INTERNAL MEDICINE

## 2025-02-25 PROCEDURE — 2500000003 HC RX 250 WO HCPCS: Performed by: INTERNAL MEDICINE

## 2025-02-25 PROCEDURE — 76705 ECHO EXAM OF ABDOMEN: CPT

## 2025-02-25 PROCEDURE — 85025 COMPLETE CBC W/AUTO DIFF WBC: CPT

## 2025-02-25 PROCEDURE — 80053 COMPREHEN METABOLIC PANEL: CPT

## 2025-02-25 PROCEDURE — 94640 AIRWAY INHALATION TREATMENT: CPT

## 2025-02-25 PROCEDURE — 84100 ASSAY OF PHOSPHORUS: CPT

## 2025-02-25 PROCEDURE — 36415 COLL VENOUS BLD VENIPUNCTURE: CPT

## 2025-02-25 PROCEDURE — 6360000002 HC RX W HCPCS: Performed by: INTERNAL MEDICINE

## 2025-02-25 PROCEDURE — 83735 ASSAY OF MAGNESIUM: CPT

## 2025-02-25 RX ORDER — FUROSEMIDE 20 MG/1
20 TABLET ORAL 2 TIMES DAILY
Qty: 60 TABLET | Refills: 3 | Status: SHIPPED | OUTPATIENT
Start: 2025-02-25

## 2025-02-25 RX ADMIN — BUDESONIDE AND FORMOTEROL FUMARATE DIHYDRATE 2 PUFF: 160; 4.5 AEROSOL RESPIRATORY (INHALATION) at 07:10

## 2025-02-25 RX ADMIN — FUROSEMIDE 20 MG: 20 TABLET ORAL at 08:20

## 2025-02-25 RX ADMIN — SODIUM CHLORIDE, PRESERVATIVE FREE 10 ML: 5 INJECTION INTRAVENOUS at 08:21

## 2025-02-25 RX ADMIN — ENOXAPARIN SODIUM 40 MG: 100 INJECTION SUBCUTANEOUS at 08:21

## 2025-02-25 RX ADMIN — TIOTROPIUM BROMIDE INHALATION SPRAY 2 PUFF: 3.12 SPRAY, METERED RESPIRATORY (INHALATION) at 07:10

## 2025-02-25 RX ADMIN — ATORVASTATIN CALCIUM 40 MG: 40 TABLET, FILM COATED ORAL at 08:20

## 2025-02-25 RX ADMIN — DOCUSATE SODIUM 100 MG: 100 CAPSULE, LIQUID FILLED ORAL at 08:20

## 2025-02-25 NOTE — DISCHARGE SUMMARY
Discharge Summary    Dana Clemons  :  1955  MRN:  [id number]    Admit date:  2025  Discharge date: 2025     Admitting Physician:  No admitting provider for patient encounter.    Advance Directive: Full Code    Consults: none    Primary Care Physician:  Trino Azar MD    Discharge Diagnoses:  Principal Problem (Resolved):    Hyponatremia  Active Problems:    * No active hospital problems. *  Resolved Problems:    Influenza A    COPD (chronic obstructive pulmonary disease) (HCC)    Hyperlipidemia      Significant Diagnostic Studies:   XR CHEST PORTABLE    Result Date: 2025  EXAM:  FRONTAL VIEW OF THE CHEST.  HISTORY:  Shortness of breath  COMPARISON:  None.  FINDINGS:   Cardiac silhouette is normal.  No organized infiltrate/consolidation.  No visible effusion or pneumothorax.  No acute osseous abnormality.       1. No acute findings.      ______________________________________ Electronically signed by: CARLTON WEST M.D. Date:     2025 Time:    20:08       CBC:   Recent Labs     25  0313 25  0350   WBC 9.5 8.7 6.0   HGB 16.4* 13.1 11.9*    272 271     BMP:    Recent Labs     25  0846 25  1457 25  0350   * 125* 130*   K 4.1 3.9 4.0   CL 87* 88* 92*   CO2 24 24 26   BUN 20 20 8   CREATININE 1.0* 1.1* 0.9   GLUCOSE 127* 109* 89     INR: No results for input(s): \"INR\" in the last 72 hours.  Lipids: No results for input(s): \"CHOL\", \"HDL\" in the last 72 hours.    Invalid input(s): \"LDLCALCU\"  ABGs:No results for input(s): \"PHART\", \"KNE1UXY\", \"PO2ART\", \"GMZ6TZF\", \"BEART\", \"HGBAE\", \"N1LCYOCR\", \"CARBOXHGBART\", \"02THERAPY\" in the last 72 hours.  HgBA1c:  No results for input(s): \"LABA1C\" in the last 72 hours.    Procedures: None  Hospital Course: Ms. Clemons was admitted  for altered mental status and hyponatremia.  She was admitted to med/surg.  Her HCTZ was stopped and she was started on Lasix.  She had improvement in her serum

## 2025-02-25 NOTE — PROGRESS NOTES
CLINICAL PHARMACY NOTE: MEDS TO BEDS    Total # of Prescriptions Filled: 1   The following medications were delivered to the patient:  Current Discharge Medication List        START taking these medications    Details   furosemide (LASIX) 20 MG tablet Take 1 tablet by mouth in the morning and 1 tablet in the evening.  Qty: 60 tablet, Refills: 3               Additional Documentation:  Delivered to patients nurse ivy at nurses station. No copay.

## 2025-02-25 NOTE — CARE COORDINATION
Pt is a 10% risk of readmission; no assessment required; Pt denies any needs.    Electronically signed by SWATHI Huang on 2/25/2025 at 11:30 AM

## 2025-02-25 NOTE — CARE COORDINATION
02/25/25 1119   IMM Letter   IMM Letter given to Patient/Family/Significant other/Guardian/POA/by: SW gave letter and explained; she didn't wish to wait 4h to d/c; LALO Allan   IMM Letter date given: 02/25/25   IMM Letter time given: 1119     LULA placed letter into soft chart; copy given to Pt  Electronically signed by SWATHI Huang on 2/25/2025 at 11:31 AM

## 2025-02-25 NOTE — PLAN OF CARE
Problem: Discharge Planning  Goal: Discharge to home or other facility with appropriate resources  2/24/2025 2258 by Dora Cuevas, RN  Outcome: Progressing  2/24/2025 1500 by Tiara Alberto RN  Outcome: Progressing  Flowsheets (Taken 2/24/2025 0730)  Discharge to home or other facility with appropriate resources:   Identify barriers to discharge with patient and caregiver   Arrange for needed discharge resources and transportation as appropriate   Identify discharge learning needs (meds, wound care, etc)   Arrange for interpreters to assist at discharge as needed   Refer to discharge planning if patient needs post-hospital services based on physician order or complex needs related to functional status, cognitive ability or social support system     Problem: Pain  Goal: Verbalizes/displays adequate comfort level or baseline comfort level  2/24/2025 2258 by Dora Cuevas, RN  Outcome: Progressing  2/24/2025 1500 by Tiara Alberto RN  Outcome: Progressing     Problem: Safety - Adult  Goal: Free from fall injury  2/24/2025 2258 by Dora Cuevas, RN  Outcome: Progressing  2/24/2025 1500 by Tiara Alberto RN  Outcome: Progressing

## 2025-02-26 NOTE — PROGRESS NOTES
Physician Progress Note      PATIENT:               TIMBO MOON  [facility] #:                  [id number]  :                       1955  ADMIT DATE:       2025 6:44 PM  DISCH DATE:        2025 3:40 PM  RESPONDING  PROVIDER #:        Aryan Melgar DO          QUERY TEXT:    Pt admitted with hyponatremia and AMS. Discharge summary reflects, \"Her HCTZ   was stopped and she was started on Lasix.  She had improvement in her serum   sodium and her mental status.\" If possible, please document in the progress   notes and discharge summary if you are evaluating and / or treating any of the   following:    The medical record reflects the following:  Risk Factors: Advanced age 69, hyponatremia, influenza and coronavirus   positive.  Clinical Indicators: Presented  fatigued and lethargic, C/O leg pain and SOB.   Confusion noted. Discharge summary reflects, \"Her HCTZ was stopped and she was   started on Lasix.  She had improvement in her serum sodium and her mental   status.  On the morning of , she was back to baseline with a sodium of 130   and was stable for discharge.\"  Treatment: HCTZ discontinued, Lasix 20 mg daily, 0.9% NS 1L IV bolus then @ 75   ml/hr.  Options provided:  -- Metabolic encephalopathy  -- Toxic encephalopathy  -- Toxic metabolic encephalopathy  -- Encephalopathy due to, please specify cause  -- Delirium due to, Please specify cause.  -- Other - I will add my own diagnosis  -- Disagree - Not applicable / Not valid  -- Disagree - Clinically unable to determine / Unknown  -- Refer to Clinical Documentation Reviewer    PROVIDER RESPONSE TEXT:    This patient has metabolic encephalopathy.    Query created by: Nubia Saldana on 2025 2:27 PM      Electronically signed by:  Aryan Melgar DO 2025 9:04 AM

## 2025-04-20 NOTE — PLAN OF CARE
Problem: Discharge Planning  Goal: Discharge to home or other facility with appropriate resources  Outcome: Progressing  Flowsheets (Taken 2/23/2025 0006)  Discharge to home or other facility with appropriate resources:   Identify barriers to discharge with patient and caregiver   Identify discharge learning needs (meds, wound care, etc)   Refer to discharge planning if patient needs post-hospital services based on physician order or complex needs related to functional status, cognitive ability or social support system   Arrange for needed discharge resources and transportation as appropriate     Problem: Pain  Goal: Verbalizes/displays adequate comfort level or baseline comfort level  Outcome: Progressing  Flowsheets (Taken 2/23/2025 0555)  Verbalizes/displays adequate comfort level or baseline comfort level:   Encourage patient to monitor pain and request assistance   Consider cultural and social influences on pain and pain management   Administer analgesics based on type and severity of pain and evaluate response      Take antibiotics as prescribed.  Follow up with primary care doctor.  Alternate ibuprofen and Tylenol for fever.  Thank you for coming to our Emergency Department today. It is important to remember that some problems or medical conditions are difficult to diagnose and may not be found or addressed during your Emergency Department visit.  These conditions often start with non-specific symptoms and can only be diagnosed on follow up visits with your primary care physician or specialist when the symptoms continue or change. Please remember that all medical conditions can change, and we cannot predict how you will be feeling tomorrow or the next day. Return to the ER with any questions/concerns, new/concerning symptoms, worsening or failure to improve.       Be sure to follow up with your primary care doctor and review all labs/imaging/tests that were performed during your ER visit with them. It is very common for us to identify non-emergent incidental findings which must be followed up with your primary care physician.  Some labs/imaging/tests may be outside of the normal range, and require non-emergent follow-up and/or further investigation/treatment/procedures/testing to help diagnose/exclude/prevent complications or other potentially serious medical conditions. Some abnormalities may not have been discussed or addressed during your ER visit. Some lab results may not return during your ER visit but can be accessible by downloading the free [facility] My[facility] gisselle or by visiting [email or web address] . It is important for you to review all labs/imaging/tests which are outside of the normal range with your physician.    An ER visit does not replace a primary care visit, and many screening tests or follow-up tests cannot be ordered by an ER doctor or performed by the ER. Some tests may even require pre-approval.    If you do not have a primary care doctor, you may contact the one listed on your discharge paperwork or you  may also call the [facility] Appointment Desk at [phone] , or [address] [place] [address] at  [phone] to schedule an appointment, or establish care with a primary care doctor or even a specialist and to obtain information about local resources. It is important to your health that you have a primary care doctor.    Please take all medications as directed. We have done our best to select a medication for you that will treat your condition however, all medications may potentially have side-effects and it is impossible to predict which medications may give you side-effects or what those side-effects (if any) those medications may give you.  If you feel that you are having a negative effect or side-effect of any medication you should stop taking those medications immediately and seek medical attention. If you feel that you are having a life-threatening reaction call 911.        Do not drive, swim, climb to height, take a bath, operate heavy machinery, drink alcohol or take potentially sedating medications, sign any legal documents or make any important decisions for 24 hours if you have received any pain medications, sedatives or mood altering drugs during your ER visit or within 24 hours of taking them if they have been prescribed to you.     You can find additional resources for Dentists, hearing aids, durable medical equipment, low cost pharmacies and other resources at [email or web address]

## (undated) DEVICE — BANDAGE,GAUZE,BULKEE II,4.5"X4.1YD,STRL: Brand: MEDLINE

## (undated) DEVICE — TRAP FLD MINIVAC MEGADYNE 100ML

## (undated) DEVICE — BNDG ESMARK STRL 6INX12FT LF

## (undated) DEVICE — ANTIBACTERIAL UNDYED BRAIDED (POLYGLACTIN 910), SYNTHETIC ABSORBABLE SUTURE: Brand: COATED VICRYL

## (undated) DEVICE — FRAZIER SUCTION INSTRUMENT 10 FR W/CONTROL VENT & OBTURATOR: Brand: FRAZIER

## (undated) DEVICE — PK EXTRM 30

## (undated) DEVICE — UNDERCAST PADDING: Brand: DEROYAL

## (undated) DEVICE — GOWN,PREVENTION PLUS,XLONG/XLARGE,STRL: Brand: MEDLINE

## (undated) DEVICE — FOAM BUMP ROUND LARGE: Brand: MEDLINE INDUSTRIES, INC.

## (undated) DEVICE — BNDG CURAD ADHS 4IN WHT

## (undated) DEVICE — ROD FIX/EXT CARB FIBR STR 11X350MM

## (undated) DEVICE — PRECISION THIN (9.0 X 0.38 X 25.0MM)

## (undated) DEVICE — DRILL,  2.4 X 140MM, SOLID, MEASURING, LONG, AO: Brand: BABY GORILLA®/GORILLA® PLATING SYSTEM

## (undated) DEVICE — COVER,MAYO STAND,STERILE: Brand: MEDLINE

## (undated) DEVICE — DRSNG GZ CURAD XEROFORM NONADHS 5X9IN STRL

## (undated) DEVICE — CAP PROTECT FIX/EXT PIN 5MM

## (undated) DEVICE — CVR UNIV C/ARM

## (undated) DEVICE — DRILL, 2.6 X 130MM, CANNULATED, AO: Brand: MONSTER SCREW SYSTEM

## (undated) DEVICE — DRILL, 2.0 X 110MM, SOLID, MEASURING, AO: Brand: BABY GORILLA®/GORILLA® PLATING SYSTEM

## (undated) DEVICE — SCREW DRIVER ATTACHMENT, R3CON, SOLID, AO, HX-10, SHORT TAPER: Brand: BABY GORILLA/GORILLA PLATING SYSTEM

## (undated) DEVICE — HANDPIECE SET WITH HIGH FLOW TIP AND SUCTION TUBE: Brand: INTERPULSE

## (undated) DEVICE — BAPTIST TURNOVER KIT: Brand: MEDLINE INDUSTRIES, INC.

## (undated) DEVICE — BIT DRL QC DIA 3.5X195MM

## (undated) DEVICE — SPNG GZ WOVN 4X4IN 12PLY 10/BX STRL

## (undated) DEVICE — CVR BRD ARM 13X30

## (undated) DEVICE — GLV SURG TRIUMPH MICRO PF LTX 6.5 STRL

## (undated) DEVICE — GLV SURG SENSICARE PI ORTHO SZ8 LF STRL

## (undated) DEVICE — 4-PORT MANIFOLD: Brand: NEPTUNE 2

## (undated) DEVICE — APPL DURAPREP IODOPHOR APL 26ML

## (undated) DEVICE — GLV SURG DERMASSURE GRN LF PF 8.0

## (undated) DEVICE — GLV SURG TRIUMPH MICRO PF LTX 7 STRL

## (undated) DEVICE — GLV SURG BIOGEL LTX PF 7 1/2

## (undated) DEVICE — CLMP FIX/EXT/LG MR/CONFIG 6POSTN PIN/LG

## (undated) DEVICE — INTENDED FOR TISSUE SEPARATION, AND OTHER PROCEDURES THAT REQUIRE A SHARP SURGICAL BLADE TO PUNCTURE OR CUT.: Brand: BARD-PARKER ® STAINLESS STEEL BLADES

## (undated) DEVICE — DRP C/ARMOR

## (undated) DEVICE — COUNTERSINK, 4.0 HEADED: Brand: MONSTER® SCREW SYSTEM

## (undated) DEVICE — SKIN PREP TRAY W/CHG: Brand: MEDLINE INDUSTRIES, INC.

## (undated) DEVICE — SUT ETHLN 3/0 FS1 30IN 669H

## (undated) DEVICE — GLV SURG TRIUMPH MICRO PF LTX 7.5 STRL

## (undated) DEVICE — DISPOSABLE TOURNIQUET CUFF SINGLE BLADDER, SINGLE PORT AND QUICK CONNECT CONNECTOR: Brand: COLOR CUFF

## (undated) DEVICE — BNDG ELAS W/CLIP 6IN 10YD LF STRL

## (undated) DEVICE — CLMP FIX/EXT/LG MR/CONFIG COMBO/LG